# Patient Record
Sex: MALE | Race: BLACK OR AFRICAN AMERICAN | NOT HISPANIC OR LATINO | Employment: FULL TIME | URBAN - METROPOLITAN AREA
[De-identification: names, ages, dates, MRNs, and addresses within clinical notes are randomized per-mention and may not be internally consistent; named-entity substitution may affect disease eponyms.]

---

## 2019-11-28 ENCOUNTER — HOSPITAL ENCOUNTER (EMERGENCY)
Facility: OTHER | Age: 34
Discharge: HOME | End: 2019-11-28
Attending: EMERGENCY MEDICINE
Payer: MEDICAID

## 2019-11-28 VITALS
DIASTOLIC BLOOD PRESSURE: 92 MMHG | TEMPERATURE: 99 F | RESPIRATION RATE: 18 BRPM | HEART RATE: 78 BPM | BODY MASS INDEX: 32.08 KG/M2 | WEIGHT: 250 LBS | OXYGEN SATURATION: 99 % | HEIGHT: 74 IN | SYSTOLIC BLOOD PRESSURE: 136 MMHG

## 2019-11-28 DIAGNOSIS — R10.2 PERINEAL PAIN IN MALE: Primary | ICD-10-CM

## 2019-11-28 DIAGNOSIS — N41.0 ACUTE PROSTATITIS: ICD-10-CM

## 2019-11-28 LAB
BACTERIA #/AREA URNS HPF: ABNORMAL /HPF
BILIRUB UR QL STRIP: NEGATIVE
CLARITY UR: ABNORMAL
COLOR UR: YELLOW
GLUCOSE UR QL STRIP: NEGATIVE
HGB UR QL STRIP: NEGATIVE
KETONES UR QL STRIP: NEGATIVE
LEUKOCYTE ESTERASE UR QL STRIP: ABNORMAL
MICROSCOPIC COMMENT: ABNORMAL
NITRITE UR QL STRIP: NEGATIVE
PH UR STRIP: 6 [PH] (ref 5–8)
PROT UR QL STRIP: NEGATIVE
SP GR UR STRIP: >=1.03 (ref 1–1.03)
URN SPEC COLLECT METH UR: ABNORMAL
UROBILINOGEN UR STRIP-ACNC: 1 EU/DL
WBC #/AREA URNS HPF: >100 /HPF (ref 0–5)
WBC CLUMPS URNS QL MICRO: ABNORMAL

## 2019-11-28 PROCEDURE — 99284 EMERGENCY DEPT VISIT MOD MDM: CPT | Mod: 25

## 2019-11-28 PROCEDURE — 87086 URINE CULTURE/COLONY COUNT: CPT

## 2019-11-28 PROCEDURE — 96372 THER/PROPH/DIAG INJ SC/IM: CPT

## 2019-11-28 PROCEDURE — 25000003 PHARM REV CODE 250: Performed by: EMERGENCY MEDICINE

## 2019-11-28 PROCEDURE — 81003 URINALYSIS AUTO W/O SCOPE: CPT

## 2019-11-28 PROCEDURE — 87491 CHLMYD TRACH DNA AMP PROBE: CPT

## 2019-11-28 PROCEDURE — 63600175 PHARM REV CODE 636 W HCPCS: Performed by: EMERGENCY MEDICINE

## 2019-11-28 PROCEDURE — 81000 URINALYSIS NONAUTO W/SCOPE: CPT

## 2019-11-28 RX ORDER — ORPHENADRINE CITRATE 30 MG/ML
60 INJECTION INTRAMUSCULAR; INTRAVENOUS
Status: COMPLETED | OUTPATIENT
Start: 2019-11-28 | End: 2019-11-28

## 2019-11-28 RX ORDER — KETOROLAC TROMETHAMINE 30 MG/ML
15 INJECTION, SOLUTION INTRAMUSCULAR; INTRAVENOUS
Status: COMPLETED | OUTPATIENT
Start: 2019-11-28 | End: 2019-11-28

## 2019-11-28 RX ORDER — METHOCARBAMOL 750 MG/1
1500 TABLET, FILM COATED ORAL 3 TIMES DAILY PRN
Qty: 30 TABLET | Refills: 0 | Status: SHIPPED | OUTPATIENT
Start: 2019-11-28 | End: 2019-12-03

## 2019-11-28 RX ORDER — DOXYCYCLINE 100 MG/1
100 CAPSULE ORAL 2 TIMES DAILY
Qty: 20 CAPSULE | Refills: 0 | Status: SHIPPED | OUTPATIENT
Start: 2019-11-28 | End: 2019-12-08

## 2019-11-28 RX ORDER — DOXYCYCLINE HYCLATE 100 MG
100 TABLET ORAL
Status: COMPLETED | OUTPATIENT
Start: 2019-11-28 | End: 2019-11-28

## 2019-11-28 RX ORDER — CEFTRIAXONE 250 MG/1
250 INJECTION, POWDER, FOR SOLUTION INTRAMUSCULAR; INTRAVENOUS
Status: COMPLETED | OUTPATIENT
Start: 2019-11-28 | End: 2019-11-28

## 2019-11-28 RX ORDER — NAPROXEN 500 MG/1
500 TABLET ORAL 2 TIMES DAILY PRN
Qty: 60 TABLET | Refills: 0 | Status: ON HOLD | OUTPATIENT
Start: 2019-11-28 | End: 2023-10-08 | Stop reason: ALTCHOICE

## 2019-11-28 RX ADMIN — CEFTRIAXONE SODIUM 250 MG: 250 INJECTION, POWDER, FOR SOLUTION INTRAMUSCULAR; INTRAVENOUS at 02:11

## 2019-11-28 RX ADMIN — ORPHENADRINE CITRATE 60 MG: 30 INJECTION INTRAMUSCULAR; INTRAVENOUS at 01:11

## 2019-11-28 RX ADMIN — DOXYCYCLINE HYCLATE 100 MG: 100 TABLET, COATED ORAL at 02:11

## 2019-11-28 RX ADMIN — KETOROLAC TROMETHAMINE 15 MG: 30 INJECTION, SOLUTION INTRAMUSCULAR; INTRAVENOUS at 01:11

## 2019-11-28 NOTE — ED TRIAGE NOTES
"Pt arrives to ED with c/o "sore taint" with onset Saturday. Pt reports cough with onset Saturday, denies cough at this time, reports frequent coughing causing soreness to testicle area. Pt denies N/V/D, chest pain, SOB. Pt sitting in exam bed, respirations even, unlabored, eyes open spontaneously, NAD noted, AAOx4, answering questions appropriately.  "

## 2019-11-28 NOTE — DISCHARGE INSTRUCTIONS
Call your primary care doctor to make the first available appointment.     Keep all your medical appointments.     Take your regular medication as prescribed. Contact your primary care provider before running out of medication, or for any problems obtaining them.    Do not drive or operate heavy machinery while taking opioid or muscle relaxing medications. Examples include norco, percocet, xanax, valium, flexeril.     Overuse or long term use of pain and sedating medication may lead to addiction, dependence, organ failure, family and work problems, legal problems, accidental overdose and death.    If you do not have health insurance, you probably qualify for heavily discounted rates:  Call 1-424.303.9525 ([facility] hotline) or go to [email or web address].[place].gov    Your evaluation in the ED does not suggest any emergent or life threatening medical condition requiring admission or immediate intervention beyond that provided in the ED.   However, the signs of a serious problem sometimes take more time to appear.   RETURN TO THE ER if any of the following occur:    Weakness, dizziness, fainting, or loss of consciousness   Fever of 100.4ºF (38ºC) or higher  Any worse symptoms  Any new or concerning symptoms

## 2019-11-28 NOTE — ED PROVIDER NOTES
"Encounter Date: 11/28/2019    SCRIBE #1 NOTE: I, Vicente Padilla, am scribing for, and in the presence of, Dr. Mahmood .       History     Chief Complaint   Patient presents with    Muscle Pain     "I think I have a pulled muscle behind my testicles" Denies any heavy lifting, or injury.     Time seen by provider: 1:52 AM    This is a 34 y.o. male who presents with complaint of muscle pain that began four days ago. The patient reports that on 11/23/19 he started to experience flu-like symptoms specifically fevers, myalgias, and malaise.  These resolved after 2 days, however  He continued to have "muscle pain" to his "taint area".  Progression of this pain is gradually worsening, constant.  It is worse with sitting, certain positions and certain movements.  He denies fever, abdominal pain, blood in stool, dysuria, penile discharge, hematuria, or urinary frequency. He tried taking Aleve with minimal relief.    The history is provided by the patient and medical records.     Review of patient's allergies indicates:  No Known Allergies  History reviewed. No pertinent past medical history.  History reviewed. No pertinent surgical history.  History reviewed. No pertinent family history.  Social History     Tobacco Use    Smoking status: Current Every Day Smoker   Substance Use Topics    Alcohol use: No    Drug use: Yes     Types: Marijuana     ROS: As per HPI and below:   General: No fever.   HENT: No facial pain.   Eyes: Negative for eye pain.   Cardiovascular: No chest pain.   Respiratory: No dyspnea.   GI: No abdominal pain. No nausea. No vomiting. No diarrhea.   : Notes perineum pain. No dysuria. No hematuria. No dysuria.   Skin: No rashes.   Neuro:  No syncope. No focal deficits.   Musculoskeletal: No extremity pain.  All other systems reviewed and are negative.      Physical Exam     Initial Vitals [11/28/19 0052]   BP Pulse Resp Temp SpO2   (!) 137/91 80 16 98.8 °F (37.1 °C) 99 %      MAP       --         BP (!) " "136/92   Pulse 78   Temp 98.8 °F (37.1 °C) (Oral)   Resp 18   Ht 6' 2" (1.88 m)   Wt 113.4 kg (250 lb)   SpO2 99%   BMI 32.10 kg/m²     Nursing note and vitals reviewed.  Constitutional: AAOx3. Well appearing.   Eyes: EOMI. No discharge. Anicteric.  HENT:   Mouth/Throat: Oropharynx is clear and moist. Uvula midline.   Neck: Normal range of motion. Neck supple.    Cardiovascular: Normal rate  Pulmonary/Chest: No respiratory distress.   Abdominal: No distension. No tenderness.   Genitourinary: Penis normal. Cremasteric reflex is present. Right testis shows no mass, no swelling and no tenderness. Left testis shows no mass, no swelling and no tenderness. No phimosis, paraphimosis or penile erythema. No discharge found. Tenderness at the perineum. No peroneal or perirectal lesions.   Exam chaperoned by tech or RN.   Musculoskeletal: Normal range of motion. No CVA tenderness.    Neurological: GCS15. Alert and oriented to person, place, and time. No gross cranial nerve II-XII, focal strength, or light touch deficit. Steady gait.   Skin: Skin is warm and dry.   Psychiatric: Behavior is normal. Judgment normal.      ED Course   Procedures  Labs Reviewed   URINALYSIS, REFLEX TO URINE CULTURE - Abnormal; Notable for the following components:       Result Value    Appearance, UA Hazy (*)     Specific Gravity, UA >=1.030 (*)     Leukocytes, UA 1+ (*)     All other components within normal limits    Narrative:     Preferred Collection Type->Urine, Clean Catch   URINALYSIS MICROSCOPIC - Abnormal; Notable for the following components:    WBC, UA >100 (*)     WBC Clumps, UA Occasional (*)     All other components within normal limits    Narrative:     Preferred Collection Type->Urine, Clean Catch   C. TRACHOMATIS/N. GONORRHOEAE BY AMP DNA   CULTURE, URINE   C. TRACHOMATIS/N. GONORRHOEAE BY AMP DNA          Imaging Results    None          Medical Decision Making:   History:   Old Medical Records: I decided to obtain old " medical records.  Clinical Tests:   Lab Tests: Ordered and Reviewed            Scribe Attestation:   Scribe #1: I performed the above scribed service and the documentation accurately describes the services I performed. I attest to the accuracy of the note.    Attending Attestation:           Physician Attestation for Scribe:  Physician Attestation Statement for Scribe #1: I, Dr. Mahmood, reviewed documentation, as scribed by Vicente Padilla  in my presence, and it is both accurate and complete.                 ED Course as of Nov 28 0638   Thu Nov 28, 2019   0256 Patient is a 34-year-old male with no reported past medical history who presents with perineal pain over the last few days in the setting of brief illness marked by myalgias, fevers and malaise.  He denies any dysuria, hematuria, urinary urgency or hesitation.  He denies any fevers in the last few days.  No change in bowel habit.  He denies any other pain.  On exam, patient has perineal tenderness, otherwise no focal findings on physical exam.  History and physical not consistent with Fourniers gangrene and other deep space infection, abscess, trauma. Differential also included UTI, prostatitis, musculoskeletal pain.  I independently reviewed and interpreted labs which are notable for pyuria without hematuria.   We will treat patient for presumptive acute prostatitis, with NSAIDs and antispasmodics for comfort.  Will have the patient follow up with Urology.  I discussed with patient and/or guardian/caretaker that this evaluation in the ED does not suggest any emergent or life threatening medical condition requiring admission or immediate intervention beyond what was provided in the ED. Regardless, an unremarkable evaluation in the ED does not preclude the development or presence of a serious or life threatening condition. As such, patient was instructed to return immediately for any worsening or change in current symptoms.     I had a detailed discussion with  patient  and/or guardian/caretaker regarding findings, plan, return precautions, importance of medication adherence, need to follow-up with a PCP and specialist. All questions answered.     Note was created using voice recognition software. It may have occasional typographical errors not identified and edited despite initial review prior to signing.          [RC]      ED Course User Index  [RC] Rafael Mahmood MD                Clinical Impression:     1. Perineal pain in male    2. Acute prostatitis                              Rafael Mahmood MD  11/28/19 0638

## 2019-11-29 LAB
BACTERIA UR CULT: NO GROWTH
C TRACH DNA SPEC QL NAA+PROBE: NOT DETECTED
N GONORRHOEA DNA SPEC QL NAA+PROBE: NOT DETECTED

## 2019-12-16 ENCOUNTER — OFFICE VISIT (OUTPATIENT)
Dept: UROLOGY | Facility: CLINIC | Age: 34
End: 2019-12-16
Payer: MEDICAID

## 2019-12-16 VITALS
HEIGHT: 74 IN | HEART RATE: 83 BPM | DIASTOLIC BLOOD PRESSURE: 78 MMHG | WEIGHT: 250 LBS | BODY MASS INDEX: 32.08 KG/M2 | SYSTOLIC BLOOD PRESSURE: 125 MMHG

## 2019-12-16 DIAGNOSIS — N41.0 ACUTE PROSTATITIS: Primary | ICD-10-CM

## 2019-12-16 PROCEDURE — 99203 OFFICE O/P NEW LOW 30 MIN: CPT | Mod: S$GLB,,, | Performed by: NURSE PRACTITIONER

## 2019-12-16 PROCEDURE — 99203 PR OFFICE/OUTPT VISIT, NEW, LEVL III, 30-44 MIN: ICD-10-PCS | Mod: S$GLB,,, | Performed by: NURSE PRACTITIONER

## 2019-12-16 RX ORDER — TAMSULOSIN HYDROCHLORIDE 0.4 MG/1
0.4 CAPSULE ORAL DAILY
Qty: 30 CAPSULE | Refills: 0 | Status: ON HOLD | OUTPATIENT
Start: 2019-12-16 | End: 2023-10-08

## 2019-12-16 RX ORDER — SULFAMETHOXAZOLE AND TRIMETHOPRIM 800; 160 MG/1; MG/1
1 TABLET ORAL 2 TIMES DAILY
Qty: 42 TABLET | Refills: 0 | Status: SHIPPED | OUTPATIENT
Start: 2019-12-16 | End: 2020-01-06

## 2019-12-16 NOTE — PROGRESS NOTES
"Subjective:      Sinan Bear is a 34 y.o. male who was self-referred for evaluation of his perineal pain.    The patient presented to the ED on 11/28 with "flu-like symptoms specifically fevers, myalgias, malaise and perineal pain."  Urine culture showed no growth. Chlamydia/gonorrhea tests were negative. He was given rocephin IV and discharged with doxycycline x 10 days.    Today he reports improvement in his symptoms. Continues to have perineal discomfort that is aggravated by sitting. Denies slow stream, ROM, and frequency and urgency. Denies recent fever/chills. Denies penile discharge.     The following portions of the patient's history were reviewed and updated as appropriate: allergies, current medications, past family history, past medical history, past social history, past surgical history and problem list.    Review of Systems  Constitutional: no fever or chills  ENT: no nasal congestion or sore throat  Respiratory: no cough or shortness of breath  Cardiovascular: no chest pain or palpitations  Gastrointestinal: no nausea or vomiting, tolerating diet  Genitourinary: as per HPI  Hematologic/Lymphatic: no easy bruising or lymphadenopathy  Musculoskeletal: no arthralgias or myalgias  Neurological: no seizures or tremors  Behavioral/Psych: no auditory or visual hallucinations     Objective:   Vitals:   Vitals:    12/16/19 1250   BP: 125/78   Pulse: 83       Physical Exam   General: alert and oriented, no acute distress  Head: normocephalic, atraumatic  Neck: supple, no lymphadenopathy, normal ROM, no masses  Respiratory: Symmetric expansion, non-labored breathing  Cardiovascular: regular rate and rhythm, nomal pulses, no peripheral edema  Abdomen: soft, non tender, non distended, no palpable masses, no hernias, no hepatomegaly or splenomegaly  Genitourinary: deferred, acute infection   Lymphatic: no inguinal nodes  Skin: normal coloration and turgor, no rashes, no suspicious skin lesions noted  Neuro: " alert and oriented x3, no gross deficits  Psych: normal judgment and insight, normal mood/affect and non-anxious    Lab Review   Urinalysis demonstrates : no specimen, neg culture on 11/28   No results found for: WBC, HGB, HCT, MCV, PLT  No results found for: CREATININE, BUN  No results found for: PSA  Imaging   None    Assessment:   Acute prostatitis    Plan:   Sinan MALONE was seen today for prostatitis.    Diagnoses and all orders for this visit:    Acute prostatitis  -     sulfamethoxazole-trimethoprim 800-160mg (BACTRIM DS) 800-160 mg Tab; Take 1 tablet by mouth 2 (two) times daily. for 21 days  -     tamsulosin (FLOMAX) 0.4 mg Cap; Take 1 capsule (0.4 mg total) by mouth once daily.    Plan:  --Bactrim BID x 3 weeks  --Flomax if needed  --Ibuprofen PRN   --Follow up PRN

## 2021-03-01 ENCOUNTER — HOSPITAL ENCOUNTER (EMERGENCY)
Facility: OTHER | Age: 36
Discharge: HOME | End: 2021-03-01
Attending: EMERGENCY MEDICINE
Payer: MEDICAID

## 2021-03-01 VITALS
HEIGHT: 74 IN | RESPIRATION RATE: 18 BRPM | BODY MASS INDEX: 32.08 KG/M2 | HEART RATE: 103 BPM | WEIGHT: 250 LBS | SYSTOLIC BLOOD PRESSURE: 128 MMHG | DIASTOLIC BLOOD PRESSURE: 80 MMHG | TEMPERATURE: 99 F | OXYGEN SATURATION: 98 %

## 2021-03-01 DIAGNOSIS — K61.1 PERIRECTAL ABSCESS: Primary | ICD-10-CM

## 2021-03-01 LAB — POCT GLUCOSE: 106 MG/DL (ref 70–110)

## 2021-03-01 PROCEDURE — 46040 I&D ISCHIORCT&/PERIRCT ABSC: CPT

## 2021-03-01 PROCEDURE — 25000003 PHARM REV CODE 250: Performed by: EMERGENCY MEDICINE

## 2021-03-01 PROCEDURE — 99284 EMERGENCY DEPT VISIT MOD MDM: CPT | Mod: 25

## 2021-03-01 PROCEDURE — 82962 GLUCOSE BLOOD TEST: CPT

## 2021-03-01 RX ORDER — HYDROCODONE BITARTRATE AND ACETAMINOPHEN 10; 325 MG/1; MG/1
1 TABLET ORAL
Status: COMPLETED | OUTPATIENT
Start: 2021-03-01 | End: 2021-03-01

## 2021-03-01 RX ORDER — CLINDAMYCIN HYDROCHLORIDE 300 MG/1
300 CAPSULE ORAL EVERY 8 HOURS
Qty: 21 CAPSULE | Refills: 0 | Status: SHIPPED | OUTPATIENT
Start: 2021-03-01 | End: 2021-08-22 | Stop reason: SDUPTHER

## 2021-03-01 RX ORDER — LIDOCAINE HYDROCHLORIDE 10 MG/ML
5 INJECTION INFILTRATION; PERINEURAL
Status: COMPLETED | OUTPATIENT
Start: 2021-03-01 | End: 2021-03-01

## 2021-03-01 RX ORDER — HYDROCODONE BITARTRATE AND ACETAMINOPHEN 5; 325 MG/1; MG/1
1 TABLET ORAL EVERY 4 HOURS PRN
Qty: 12 TABLET | Refills: 0 | Status: ON HOLD | OUTPATIENT
Start: 2021-03-01 | End: 2023-10-08 | Stop reason: ALTCHOICE

## 2021-03-01 RX ADMIN — HYDROCODONE BITARTRATE AND ACETAMINOPHEN 1 TABLET: 10; 325 TABLET ORAL at 01:03

## 2021-03-01 RX ADMIN — LIDOCAINE HYDROCHLORIDE 5 ML: 10 INJECTION, SOLUTION INFILTRATION; PERINEURAL at 01:03

## 2021-05-03 ENCOUNTER — HOSPITAL ENCOUNTER (EMERGENCY)
Facility: OTHER | Age: 36
Discharge: HOME | End: 2021-05-04
Attending: EMERGENCY MEDICINE
Payer: MEDICAID

## 2021-05-03 DIAGNOSIS — R10.9 RIGHT FLANK PAIN: Primary | ICD-10-CM

## 2021-05-03 LAB
ANION GAP SERPL CALC-SCNC: 10 MMOL/L (ref 8–16)
BASOPHILS # BLD AUTO: 0.03 K/UL (ref 0–0.2)
BASOPHILS NFR BLD: 0.5 % (ref 0–1.9)
BUN SERPL-MCNC: 10 MG/DL (ref 6–20)
CALCIUM SERPL-MCNC: 9.1 MG/DL (ref 8.7–10.5)
CHLORIDE SERPL-SCNC: 109 MMOL/L (ref 95–110)
CO2 SERPL-SCNC: 20 MMOL/L (ref 23–29)
CREAT SERPL-MCNC: 1 MG/DL (ref 0.5–1.4)
DIFFERENTIAL METHOD: ABNORMAL
EOSINOPHIL # BLD AUTO: 0.5 K/UL (ref 0–0.5)
EOSINOPHIL NFR BLD: 8.6 % (ref 0–8)
ERYTHROCYTE [DISTWIDTH] IN BLOOD BY AUTOMATED COUNT: 13.2 % (ref 11.5–14.5)
EST. GFR  (AFRICAN AMERICAN): >60 ML/MIN/1.73 M^2
EST. GFR  (NON AFRICAN AMERICAN): >60 ML/MIN/1.73 M^2
GLUCOSE SERPL-MCNC: 95 MG/DL (ref 70–110)
HCT VFR BLD AUTO: 46.4 % (ref 40–54)
HGB BLD-MCNC: 15.4 G/DL (ref 14–18)
IMM GRANULOCYTES # BLD AUTO: 0.01 K/UL (ref 0–0.04)
IMM GRANULOCYTES NFR BLD AUTO: 0.2 % (ref 0–0.5)
LYMPHOCYTES # BLD AUTO: 2.8 K/UL (ref 1–4.8)
LYMPHOCYTES NFR BLD: 50.8 % (ref 18–48)
MCH RBC QN AUTO: 30.9 PG (ref 27–31)
MCHC RBC AUTO-ENTMCNC: 33.2 G/DL (ref 32–36)
MCV RBC AUTO: 93 FL (ref 82–98)
MONOCYTES # BLD AUTO: 0.4 K/UL (ref 0.3–1)
MONOCYTES NFR BLD: 7 % (ref 4–15)
NEUTROPHILS # BLD AUTO: 1.8 K/UL (ref 1.8–7.7)
NEUTROPHILS NFR BLD: 32.9 % (ref 38–73)
NRBC BLD-RTO: 0 /100 WBC
PLATELET # BLD AUTO: 272 K/UL (ref 150–450)
PMV BLD AUTO: 8.6 FL (ref 9.2–12.9)
POTASSIUM SERPL-SCNC: 4.3 MMOL/L (ref 3.5–5.1)
RBC # BLD AUTO: 4.98 M/UL (ref 4.6–6.2)
SODIUM SERPL-SCNC: 139 MMOL/L (ref 136–145)
WBC # BLD AUTO: 5.55 K/UL (ref 3.9–12.7)

## 2021-05-03 PROCEDURE — 80048 BASIC METABOLIC PNL TOTAL CA: CPT | Performed by: EMERGENCY MEDICINE

## 2021-05-03 PROCEDURE — 85025 COMPLETE CBC W/AUTO DIFF WBC: CPT | Performed by: EMERGENCY MEDICINE

## 2021-05-03 PROCEDURE — 99283 EMERGENCY DEPT VISIT LOW MDM: CPT

## 2021-05-04 VITALS
DIASTOLIC BLOOD PRESSURE: 78 MMHG | TEMPERATURE: 99 F | SYSTOLIC BLOOD PRESSURE: 140 MMHG | BODY MASS INDEX: 32.1 KG/M2 | WEIGHT: 250 LBS | OXYGEN SATURATION: 99 % | HEART RATE: 78 BPM | RESPIRATION RATE: 18 BRPM

## 2021-05-04 RX ORDER — NAPROXEN 500 MG/1
500 TABLET ORAL 2 TIMES DAILY WITH MEALS
Qty: 20 TABLET | Refills: 0 | Status: ON HOLD | OUTPATIENT
Start: 2021-05-04 | End: 2023-10-08 | Stop reason: ALTCHOICE

## 2021-05-05 ENCOUNTER — HOSPITAL ENCOUNTER (EMERGENCY)
Facility: OTHER | Age: 36
Discharge: HOME | End: 2021-05-05
Attending: EMERGENCY MEDICINE
Payer: MEDICAID

## 2021-05-05 VITALS
TEMPERATURE: 99 F | HEIGHT: 74 IN | RESPIRATION RATE: 18 BRPM | BODY MASS INDEX: 32.08 KG/M2 | SYSTOLIC BLOOD PRESSURE: 143 MMHG | DIASTOLIC BLOOD PRESSURE: 74 MMHG | WEIGHT: 250 LBS | HEART RATE: 78 BPM | OXYGEN SATURATION: 98 %

## 2021-05-05 DIAGNOSIS — M54.50 ACUTE RIGHT-SIDED LOW BACK PAIN WITHOUT SCIATICA: Primary | ICD-10-CM

## 2021-05-05 DIAGNOSIS — T14.8XXA MUSCULOSKELETAL STRAIN: ICD-10-CM

## 2021-05-05 LAB
BACTERIA #/AREA URNS HPF: ABNORMAL /HPF
BILIRUB UR QL STRIP: NEGATIVE
CLARITY UR: ABNORMAL
COLOR UR: YELLOW
GLUCOSE UR QL STRIP: NEGATIVE
HGB UR QL STRIP: NEGATIVE
KETONES UR QL STRIP: ABNORMAL
LEUKOCYTE ESTERASE UR QL STRIP: ABNORMAL
MICROSCOPIC COMMENT: ABNORMAL
NITRITE UR QL STRIP: NEGATIVE
PH UR STRIP: 6 [PH] (ref 5–8)
PROT UR QL STRIP: NEGATIVE
SP GR UR STRIP: >=1.03 (ref 1–1.03)
SQUAMOUS #/AREA URNS HPF: 3 /HPF
URN SPEC COLLECT METH UR: ABNORMAL
UROBILINOGEN UR STRIP-ACNC: NEGATIVE EU/DL
WBC #/AREA URNS HPF: 7 /HPF (ref 0–5)

## 2021-05-05 PROCEDURE — 25000003 PHARM REV CODE 250: Performed by: NURSE PRACTITIONER

## 2021-05-05 PROCEDURE — 99284 EMERGENCY DEPT VISIT MOD MDM: CPT

## 2021-05-05 PROCEDURE — 81000 URINALYSIS NONAUTO W/SCOPE: CPT | Performed by: EMERGENCY MEDICINE

## 2021-05-05 RX ORDER — LIDOCAINE 50 MG/G
1 PATCH TOPICAL
Status: DISCONTINUED | OUTPATIENT
Start: 2021-05-05 | End: 2021-05-05 | Stop reason: HOSPADM

## 2021-05-05 RX ORDER — LIDOCAINE 50 MG/G
1 PATCH TOPICAL DAILY
Qty: 15 PATCH | Refills: 0 | Status: ON HOLD | OUTPATIENT
Start: 2021-05-05 | End: 2023-10-08 | Stop reason: ALTCHOICE

## 2021-05-05 RX ORDER — NAPROXEN 500 MG/1
500 TABLET ORAL
Status: COMPLETED | OUTPATIENT
Start: 2021-05-05 | End: 2021-05-05

## 2021-05-05 RX ORDER — METHOCARBAMOL 750 MG/1
1500 TABLET, FILM COATED ORAL 3 TIMES DAILY
Qty: 30 TABLET | Refills: 0 | Status: SHIPPED | OUTPATIENT
Start: 2021-05-05 | End: 2021-05-10

## 2021-05-05 RX ORDER — METHOCARBAMOL 750 MG/1
1500 TABLET, FILM COATED ORAL 3 TIMES DAILY
Qty: 30 TABLET | Refills: 0 | Status: SHIPPED | OUTPATIENT
Start: 2021-05-05 | End: 2021-05-05 | Stop reason: SDUPTHER

## 2021-05-05 RX ORDER — LIDOCAINE 50 MG/G
1 PATCH TOPICAL DAILY
Qty: 15 PATCH | Refills: 0 | Status: SHIPPED | OUTPATIENT
Start: 2021-05-05 | End: 2021-05-05 | Stop reason: SDUPTHER

## 2021-05-05 RX ORDER — METHOCARBAMOL 750 MG/1
1500 TABLET, FILM COATED ORAL
Status: COMPLETED | OUTPATIENT
Start: 2021-05-05 | End: 2021-05-05

## 2021-05-05 RX ADMIN — LIDOCAINE 1 PATCH: 50 PATCH TOPICAL at 06:05

## 2021-05-05 RX ADMIN — METHOCARBAMOL TABLETS 1500 MG: 750 TABLET, COATED ORAL at 06:05

## 2021-05-05 RX ADMIN — NAPROXEN 500 MG: 500 TABLET ORAL at 06:05

## 2021-08-21 PROCEDURE — 10061 I&D ABSCESS COMP/MULTIPLE: CPT

## 2021-08-21 PROCEDURE — 99283 EMERGENCY DEPT VISIT LOW MDM: CPT | Mod: 25

## 2021-08-22 ENCOUNTER — HOSPITAL ENCOUNTER (EMERGENCY)
Facility: OTHER | Age: 36
Discharge: HOME | End: 2021-08-22
Attending: EMERGENCY MEDICINE
Payer: MEDICAID

## 2021-08-22 VITALS
BODY MASS INDEX: 32.08 KG/M2 | DIASTOLIC BLOOD PRESSURE: 88 MMHG | HEIGHT: 74 IN | TEMPERATURE: 99 F | RESPIRATION RATE: 18 BRPM | SYSTOLIC BLOOD PRESSURE: 163 MMHG | HEART RATE: 89 BPM | WEIGHT: 250 LBS | OXYGEN SATURATION: 100 %

## 2021-08-22 DIAGNOSIS — L02.31 GLUTEAL ABSCESS: Primary | ICD-10-CM

## 2021-08-22 DIAGNOSIS — R03.0 ELEVATED BLOOD PRESSURE READING: ICD-10-CM

## 2021-08-22 PROCEDURE — 10061 I&D ABSCESS COMP/MULTIPLE: CPT

## 2021-08-22 PROCEDURE — 25000003 PHARM REV CODE 250: Performed by: EMERGENCY MEDICINE

## 2021-08-22 RX ORDER — LIDOCAINE HYDROCHLORIDE 10 MG/ML
10 INJECTION INFILTRATION; PERINEURAL
Status: COMPLETED | OUTPATIENT
Start: 2021-08-22 | End: 2021-08-22

## 2021-08-22 RX ORDER — CLINDAMYCIN HYDROCHLORIDE 300 MG/1
300 CAPSULE ORAL EVERY 8 HOURS
Qty: 21 CAPSULE | Refills: 0 | Status: ON HOLD | OUTPATIENT
Start: 2021-08-22 | End: 2023-10-08 | Stop reason: ALTCHOICE

## 2021-08-22 RX ORDER — CLINDAMYCIN HYDROCHLORIDE 150 MG/1
300 CAPSULE ORAL
Status: COMPLETED | OUTPATIENT
Start: 2021-08-22 | End: 2021-08-22

## 2021-08-22 RX ADMIN — LIDOCAINE HYDROCHLORIDE 10 ML: 10 INJECTION, SOLUTION INFILTRATION; PERINEURAL at 12:08

## 2021-08-22 RX ADMIN — CLINDAMYCIN HYDROCHLORIDE 300 MG: 150 CAPSULE ORAL at 12:08

## 2023-10-07 ENCOUNTER — HOSPITAL ENCOUNTER (INPATIENT)
Facility: OTHER | Age: 38
LOS: 3 days | Discharge: HOME | DRG: 603 | End: 2023-10-11
Attending: EMERGENCY MEDICINE | Admitting: HOSPITALIST
Payer: COMMERCIAL

## 2023-10-07 DIAGNOSIS — N41.9 PROSTATITIS, UNSPECIFIED PROSTATITIS TYPE: Primary | ICD-10-CM

## 2023-10-07 DIAGNOSIS — Z76.89 ENCOUNTER TO ESTABLISH CARE: ICD-10-CM

## 2023-10-07 DIAGNOSIS — F17.210 NICOTINE DEPENDENCE, CIGARETTES, UNCOMPLICATED: ICD-10-CM

## 2023-10-07 DIAGNOSIS — Z72.0 TOBACCO ABUSE: ICD-10-CM

## 2023-10-07 DIAGNOSIS — R03.0 ELEVATED BP WITHOUT DIAGNOSIS OF HYPERTENSION: ICD-10-CM

## 2023-10-07 DIAGNOSIS — N48.21 PENILE ABSCESS: ICD-10-CM

## 2023-10-07 DIAGNOSIS — L02.215 PERINEAL ABSCESS: ICD-10-CM

## 2023-10-07 PROCEDURE — 80053 COMPREHEN METABOLIC PANEL: CPT | Performed by: EMERGENCY MEDICINE

## 2023-10-07 PROCEDURE — 87389 HIV-1 AG W/HIV-1&-2 AB AG IA: CPT | Performed by: INTERNAL MEDICINE

## 2023-10-07 PROCEDURE — 99285 EMERGENCY DEPT VISIT HI MDM: CPT

## 2023-10-07 PROCEDURE — 86803 HEPATITIS C AB TEST: CPT | Performed by: INTERNAL MEDICINE

## 2023-10-07 PROCEDURE — 85025 COMPLETE CBC W/AUTO DIFF WBC: CPT | Performed by: EMERGENCY MEDICINE

## 2023-10-07 NOTE — Clinical Note
Diagnosis: Prostatitis, unspecified prostatitis type [[id number]]   Admitting Provider:: VERNA AVILES [[id number]]   Future Attending Provider: VERNA AVILES [[id number]]   Reason for IP Medical Treatment  (Clinical interventions that can only be accomplished in the IP setting? ) :: abscess   I certify that Inpatient services for greater than or equal to 2 midnights are medically necessary:: Yes   Plans for Post-Acute care--if anticipated (pick the single best option):: C. Discharge home with home health services

## 2023-10-08 PROBLEM — F17.210 NICOTINE DEPENDENCE, CIGARETTES, UNCOMPLICATED: Status: ACTIVE | Noted: 2023-10-08

## 2023-10-08 PROBLEM — L02.91 ABSCESS: Status: ACTIVE | Noted: 2023-10-08

## 2023-10-08 PROBLEM — L02.215 PERINEAL ABSCESS: Status: ACTIVE | Noted: 2023-10-08

## 2023-10-08 LAB
ALBUMIN SERPL BCP-MCNC: 4 G/DL (ref 3.5–5.2)
ALBUMIN SERPL BCP-MCNC: 4.1 G/DL (ref 3.5–5.2)
ALP SERPL-CCNC: 71 U/L (ref 55–135)
ALP SERPL-CCNC: 73 U/L (ref 55–135)
ALT SERPL W/O P-5'-P-CCNC: 20 U/L (ref 10–44)
ALT SERPL W/O P-5'-P-CCNC: 23 U/L (ref 10–44)
ANION GAP SERPL CALC-SCNC: 10 MMOL/L (ref 8–16)
ANION GAP SERPL CALC-SCNC: 11 MMOL/L (ref 8–16)
AST SERPL-CCNC: 17 U/L (ref 10–40)
AST SERPL-CCNC: 19 U/L (ref 10–40)
BASOPHILS # BLD AUTO: 0.03 K/UL (ref 0–0.2)
BASOPHILS # BLD AUTO: 0.04 K/UL (ref 0–0.2)
BASOPHILS NFR BLD: 0.4 % (ref 0–1.9)
BASOPHILS NFR BLD: 0.4 % (ref 0–1.9)
BILIRUB SERPL-MCNC: 0.3 MG/DL (ref 0.1–1)
BILIRUB SERPL-MCNC: 0.6 MG/DL (ref 0.1–1)
BILIRUB UR QL STRIP: NEGATIVE
BUN SERPL-MCNC: 11 MG/DL (ref 6–20)
BUN SERPL-MCNC: 7 MG/DL (ref 6–20)
CALCIUM SERPL-MCNC: 9.3 MG/DL (ref 8.7–10.5)
CALCIUM SERPL-MCNC: 9.5 MG/DL (ref 8.7–10.5)
CHLORIDE SERPL-SCNC: 107 MMOL/L (ref 95–110)
CHLORIDE SERPL-SCNC: 107 MMOL/L (ref 95–110)
CLARITY UR: CLEAR
CO2 SERPL-SCNC: 22 MMOL/L (ref 23–29)
CO2 SERPL-SCNC: 23 MMOL/L (ref 23–29)
COLOR UR: YELLOW
CREAT SERPL-MCNC: 1 MG/DL (ref 0.5–1.4)
CREAT SERPL-MCNC: 1 MG/DL (ref 0.5–1.4)
DIFFERENTIAL METHOD: ABNORMAL
DIFFERENTIAL METHOD: ABNORMAL
EOSINOPHIL # BLD AUTO: 0.3 K/UL (ref 0–0.5)
EOSINOPHIL # BLD AUTO: 0.3 K/UL (ref 0–0.5)
EOSINOPHIL NFR BLD: 3.4 % (ref 0–8)
EOSINOPHIL NFR BLD: 4.1 % (ref 0–8)
ERYTHROCYTE [DISTWIDTH] IN BLOOD BY AUTOMATED COUNT: 12.7 % (ref 11.5–14.5)
ERYTHROCYTE [DISTWIDTH] IN BLOOD BY AUTOMATED COUNT: 12.8 % (ref 11.5–14.5)
EST. GFR  (NO RACE VARIABLE): >60 ML/MIN/1.73 M^2
EST. GFR  (NO RACE VARIABLE): >60 ML/MIN/1.73 M^2
GLUCOSE SERPL-MCNC: 103 MG/DL (ref 70–110)
GLUCOSE SERPL-MCNC: 94 MG/DL (ref 70–110)
GLUCOSE UR QL STRIP: NEGATIVE
HCT VFR BLD AUTO: 45.8 % (ref 40–54)
HCT VFR BLD AUTO: 48.1 % (ref 40–54)
HCV AB SERPL QL IA: NEGATIVE
HGB BLD-MCNC: 15.3 G/DL (ref 14–18)
HGB BLD-MCNC: 15.8 G/DL (ref 14–18)
HGB UR QL STRIP: NEGATIVE
HIV 1+2 AB+HIV1 P24 AG SERPL QL IA: NEGATIVE
IMM GRANULOCYTES # BLD AUTO: 0.02 K/UL (ref 0–0.04)
IMM GRANULOCYTES # BLD AUTO: 0.03 K/UL (ref 0–0.04)
IMM GRANULOCYTES NFR BLD AUTO: 0.2 % (ref 0–0.5)
IMM GRANULOCYTES NFR BLD AUTO: 0.4 % (ref 0–0.5)
KETONES UR QL STRIP: NEGATIVE
LEUKOCYTE ESTERASE UR QL STRIP: NEGATIVE
LYMPHOCYTES # BLD AUTO: 3.1 K/UL (ref 1–4.8)
LYMPHOCYTES # BLD AUTO: 4.2 K/UL (ref 1–4.8)
LYMPHOCYTES NFR BLD: 36.9 % (ref 18–48)
LYMPHOCYTES NFR BLD: 44.5 % (ref 18–48)
MAGNESIUM SERPL-MCNC: 2.1 MG/DL (ref 1.6–2.6)
MCH RBC QN AUTO: 31 PG (ref 27–31)
MCH RBC QN AUTO: 31.2 PG (ref 27–31)
MCHC RBC AUTO-ENTMCNC: 32.8 G/DL (ref 32–36)
MCHC RBC AUTO-ENTMCNC: 33.4 G/DL (ref 32–36)
MCV RBC AUTO: 93 FL (ref 82–98)
MCV RBC AUTO: 94 FL (ref 82–98)
MONOCYTES # BLD AUTO: 0.7 K/UL (ref 0.3–1)
MONOCYTES # BLD AUTO: 0.9 K/UL (ref 0.3–1)
MONOCYTES NFR BLD: 8.3 % (ref 4–15)
MONOCYTES NFR BLD: 9.9 % (ref 4–15)
NEUTROPHILS # BLD AUTO: 3.9 K/UL (ref 1.8–7.7)
NEUTROPHILS # BLD AUTO: 4.2 K/UL (ref 1.8–7.7)
NEUTROPHILS NFR BLD: 41.6 % (ref 38–73)
NEUTROPHILS NFR BLD: 49.9 % (ref 38–73)
NITRITE UR QL STRIP: NEGATIVE
NRBC BLD-RTO: 0 /100 WBC
NRBC BLD-RTO: 0 /100 WBC
PH UR STRIP: 6 [PH] (ref 5–8)
PHOSPHATE SERPL-MCNC: 3.1 MG/DL (ref 2.7–4.5)
PLATELET # BLD AUTO: 270 K/UL (ref 150–450)
PLATELET # BLD AUTO: 292 K/UL (ref 150–450)
PMV BLD AUTO: 8.6 FL (ref 9.2–12.9)
PMV BLD AUTO: 8.8 FL (ref 9.2–12.9)
POTASSIUM SERPL-SCNC: 4 MMOL/L (ref 3.5–5.1)
POTASSIUM SERPL-SCNC: 4 MMOL/L (ref 3.5–5.1)
PROT SERPL-MCNC: 8 G/DL (ref 6–8.4)
PROT SERPL-MCNC: 8.1 G/DL (ref 6–8.4)
PROT UR QL STRIP: NEGATIVE
RBC # BLD AUTO: 4.91 M/UL (ref 4.6–6.2)
RBC # BLD AUTO: 5.1 M/UL (ref 4.6–6.2)
SODIUM SERPL-SCNC: 140 MMOL/L (ref 136–145)
SODIUM SERPL-SCNC: 140 MMOL/L (ref 136–145)
SP GR UR STRIP: 1.01 (ref 1–1.03)
URN SPEC COLLECT METH UR: NORMAL
UROBILINOGEN UR STRIP-ACNC: NEGATIVE EU/DL
WBC # BLD AUTO: 8.33 K/UL (ref 3.9–12.7)
WBC # BLD AUTO: 9.37 K/UL (ref 3.9–12.7)

## 2023-10-08 PROCEDURE — 11000001 HC ACUTE MED/SURG PRIVATE ROOM

## 2023-10-08 PROCEDURE — 81003 URINALYSIS AUTO W/O SCOPE: CPT | Performed by: EMERGENCY MEDICINE

## 2023-10-08 PROCEDURE — 63600175 PHARM REV CODE 636 W HCPCS: Performed by: EMERGENCY MEDICINE

## 2023-10-08 PROCEDURE — 25000003 PHARM REV CODE 250: Performed by: NURSE PRACTITIONER

## 2023-10-08 PROCEDURE — 84100 ASSAY OF PHOSPHORUS: CPT | Performed by: NURSE PRACTITIONER

## 2023-10-08 PROCEDURE — 63600175 PHARM REV CODE 636 W HCPCS: Performed by: NURSE PRACTITIONER

## 2023-10-08 PROCEDURE — 25000003 PHARM REV CODE 250: Performed by: EMERGENCY MEDICINE

## 2023-10-08 PROCEDURE — 25500020 PHARM REV CODE 255: Performed by: EMERGENCY MEDICINE

## 2023-10-08 PROCEDURE — 63600175 PHARM REV CODE 636 W HCPCS: Performed by: HOSPITALIST

## 2023-10-08 PROCEDURE — 99223 PR INITIAL HOSPITAL CARE,LEVL III: ICD-10-PCS | Mod: ,,, | Performed by: NURSE PRACTITIONER

## 2023-10-08 PROCEDURE — 83735 ASSAY OF MAGNESIUM: CPT | Performed by: NURSE PRACTITIONER

## 2023-10-08 PROCEDURE — 99233 PR SUBSEQUENT HOSPITAL CARE,LEVL III: ICD-10-PCS | Mod: ,,, | Performed by: UROLOGY

## 2023-10-08 PROCEDURE — 99223 1ST HOSP IP/OBS HIGH 75: CPT | Mod: ,,, | Performed by: NURSE PRACTITIONER

## 2023-10-08 PROCEDURE — 80053 COMPREHEN METABOLIC PANEL: CPT | Performed by: NURSE PRACTITIONER

## 2023-10-08 PROCEDURE — 25000003 PHARM REV CODE 250: Performed by: HOSPITALIST

## 2023-10-08 PROCEDURE — 36415 COLL VENOUS BLD VENIPUNCTURE: CPT | Performed by: NURSE PRACTITIONER

## 2023-10-08 PROCEDURE — 85025 COMPLETE CBC W/AUTO DIFF WBC: CPT | Performed by: NURSE PRACTITIONER

## 2023-10-08 PROCEDURE — 99233 SBSQ HOSP IP/OBS HIGH 50: CPT | Mod: ,,, | Performed by: UROLOGY

## 2023-10-08 RX ORDER — NALOXONE HCL 0.4 MG/ML
0.02 VIAL (ML) INJECTION
Status: DISCONTINUED | OUTPATIENT
Start: 2023-10-08 | End: 2023-10-11 | Stop reason: HOSPADM

## 2023-10-08 RX ORDER — SODIUM CHLORIDE 9 MG/ML
INJECTION, SOLUTION INTRAVENOUS CONTINUOUS
Status: DISCONTINUED | OUTPATIENT
Start: 2023-10-08 | End: 2023-10-10

## 2023-10-08 RX ORDER — HYDROCODONE BITARTRATE AND ACETAMINOPHEN 5; 325 MG/1; MG/1
1 TABLET ORAL EVERY 4 HOURS PRN
Status: DISCONTINUED | OUTPATIENT
Start: 2023-10-08 | End: 2023-10-11 | Stop reason: HOSPADM

## 2023-10-08 RX ORDER — CIPROFLOXACIN 500 MG/1
500 TABLET ORAL
Status: DISCONTINUED | OUTPATIENT
Start: 2023-10-08 | End: 2023-10-08

## 2023-10-08 RX ORDER — ONDANSETRON 2 MG/ML
4 INJECTION INTRAMUSCULAR; INTRAVENOUS EVERY 8 HOURS PRN
Status: DISCONTINUED | OUTPATIENT
Start: 2023-10-08 | End: 2023-10-11 | Stop reason: HOSPADM

## 2023-10-08 RX ORDER — MORPHINE SULFATE 4 MG/ML
4 INJECTION, SOLUTION INTRAMUSCULAR; INTRAVENOUS
Status: DISCONTINUED | OUTPATIENT
Start: 2023-10-08 | End: 2023-10-08

## 2023-10-08 RX ORDER — SODIUM CHLORIDE 0.9 % (FLUSH) 0.9 %
10 SYRINGE (ML) INJECTION EVERY 8 HOURS PRN
Status: DISCONTINUED | OUTPATIENT
Start: 2023-10-08 | End: 2023-10-11 | Stop reason: HOSPADM

## 2023-10-08 RX ORDER — ACETAMINOPHEN 325 MG/1
650 TABLET ORAL EVERY 4 HOURS PRN
Status: DISCONTINUED | OUTPATIENT
Start: 2023-10-08 | End: 2023-10-11 | Stop reason: HOSPADM

## 2023-10-08 RX ORDER — MORPHINE SULFATE 4 MG/ML
4 INJECTION, SOLUTION INTRAMUSCULAR; INTRAVENOUS EVERY 4 HOURS PRN
Status: DISCONTINUED | OUTPATIENT
Start: 2023-10-08 | End: 2023-10-11 | Stop reason: HOSPADM

## 2023-10-08 RX ORDER — ONDANSETRON 2 MG/ML
4 INJECTION INTRAMUSCULAR; INTRAVENOUS ONCE AS NEEDED
Status: DISCONTINUED | OUTPATIENT
Start: 2023-10-08 | End: 2023-10-11 | Stop reason: HOSPADM

## 2023-10-08 RX ORDER — SODIUM CHLORIDE 9 MG/ML
1000 INJECTION, SOLUTION INTRAVENOUS
Status: DISCONTINUED | OUTPATIENT
Start: 2023-10-08 | End: 2023-10-08

## 2023-10-08 RX ORDER — IBUPROFEN 200 MG
1 TABLET ORAL DAILY
Status: DISCONTINUED | OUTPATIENT
Start: 2023-10-08 | End: 2023-10-11 | Stop reason: HOSPADM

## 2023-10-08 RX ADMIN — VANCOMYCIN HYDROCHLORIDE 2000 MG: 500 INJECTION, POWDER, LYOPHILIZED, FOR SOLUTION INTRAVENOUS at 05:10

## 2023-10-08 RX ADMIN — PIPERACILLIN AND TAZOBACTAM 4.5 G: 4; .5 INJECTION, POWDER, LYOPHILIZED, FOR SOLUTION INTRAVENOUS; PARENTERAL at 08:10

## 2023-10-08 RX ADMIN — IOHEXOL 100 ML: 350 INJECTION, SOLUTION INTRAVENOUS at 01:10

## 2023-10-08 RX ADMIN — PIPERACILLIN AND TAZOBACTAM 4.5 G: 4; .5 INJECTION, POWDER, LYOPHILIZED, FOR SOLUTION INTRAVENOUS; PARENTERAL at 12:10

## 2023-10-08 RX ADMIN — PIPERACILLIN AND TAZOBACTAM 4.5 G: 4; .5 INJECTION, POWDER, LYOPHILIZED, FOR SOLUTION INTRAVENOUS; PARENTERAL at 03:10

## 2023-10-08 RX ADMIN — MORPHINE SULFATE 4 MG: 4 INJECTION, SOLUTION INTRAMUSCULAR; INTRAVENOUS at 05:10

## 2023-10-08 RX ADMIN — SODIUM CHLORIDE: 9 INJECTION, SOLUTION INTRAVENOUS at 04:10

## 2023-10-08 RX ADMIN — MORPHINE SULFATE 4 MG: 4 INJECTION, SOLUTION INTRAMUSCULAR; INTRAVENOUS at 01:10

## 2023-10-08 RX ADMIN — MORPHINE SULFATE 4 MG: 4 INJECTION, SOLUTION INTRAMUSCULAR; INTRAVENOUS at 10:10

## 2023-10-08 NOTE — PLAN OF CARE
Case Management Assessment     PCP: Patient has no PcP  Pharmacy: Bedside     Patient Arrived From: Home   Existing Help at Home: en     Barriers to Discharge: none    Discharge Plan:    A. Home with family    B. Home health      10/08/23 1018   Discharge Assessment   Assessment Type Discharge Planning Assessment   Confirmed/corrected address, phone number and insurance Yes   Confirmed Demographics Correct on Facesheet   Source of Information patient;family;health record   People in Home significant other   Do you expect to return to your current living situation? Yes   Do you have help at home or someone to help you manage your care at home? Yes   Prior to hospitilization cognitive status: Alert/Oriented   Current cognitive status: Alert/Oriented   Equipment Currently Used at Home none   Readmission within 30 days? No   Do you currently have service(s) that help you manage your care at home? No   Do you take prescription medications? No   Do you have prescription coverage? Yes   Coverage [employer] BETTER   Do you have any problems affording any of your prescribed medications? No   How do you get to doctors appointments? car, drives self;family or friend will provide   Are you on dialysis? No   Do you take coumadin? No   DME Needed Upon Discharge  none   Discharge Plan discussed with: Patient;Spouse/sig other   Transition of Care Barriers None   Discharge Plan A Home with family   Discharge Plan B Home Health   Physical Activity   On average, how many days per week do you engage in moderate to strenuous exercise (like a brisk walk)? 4 days   On average, how many minutes do you engage in exercise at this level? 30 min   Financial Resource Strain   How hard is it for you to pay for the very basics like food, housing, medical care, and heating? Not hard   Housing Stability   In the last 12 months, was there a time when you were not able to pay the mortgage or rent on time? N   In the last 12 months, was there a time  when you did not have a steady place to sleep or slept in a shelter (including now)? N   Transportation Needs   In the past 12 months, has lack of transportation kept you from medical appointments or from getting medications? no   In the past 12 months, has lack of transportation kept you from meetings, work, or from getting things needed for daily living? No   Food Insecurity   Within the past 12 months, you worried that your food would run out before you got the money to buy more. Never true   Within the past 12 months, the food you bought just didn't last and you didn't have money to get more. Never true   Stress   Do you feel stress - tense, restless, nervous, or anxious, or unable to sleep at night because your mind is troubled all the time - these days? Not at all   Social Connections   In a typical week, how many times do you talk on the phone with family, friends, or neighbors? Three   How often do you get together with friends or relatives? Three times   How often do you attend Shinto or Rastafarian services? 1 to 4   Do you belong to any clubs or organizations such as Shinto groups, unions, fraternal or athletic groups, or school groups? No   How often do you attend meetings of the clubs or organizations you belong to? Never   Are you , , , , never , or living with a partner?

## 2023-10-08 NOTE — PROGRESS NOTES
"[address] Medicine  Progress Note    Patient Name: Sinan Bear  MRN: [id number]  Patient Class: IP- Inpatient   Admission Date: 10/7/2023  Length of Stay: 0 days  Attending Physician: Lucille Caicedo MD  Primary Care Provider: Raisa, Primary Doctor        Subjective:     Principal Problem:Abscess        HPI:  From H&P by Joon Cintron NP:  "The patient is a 38 year old male with no significant past medical history who presents with a painful area between his penis and rectum.  He states the pain started in the last 24 hours with acute worsening over the last 3-4 hours.  CT done showing abscess at the base of the penis.  He will be admitted for further management of his abscess and Urology consult."      Overview/Hospital Course:  Patient made NPO and admitted on empiric IV antibiotics (vancomycin and Zosyn) and Urology was consulted for evaluation.      Interval History: Patient is new to me.  He has no complaints - has some pain in the perineal area but does not want more pain medication.  No nausea.  He refused to have labs drawn this AM.  He confirms that he has no medical problems, takes no medications but does smoke cigarettes every day.  Nicotine patch was ordered but he declined it.    Review of Systems   Constitutional:  Negative for chills and fever.   Respiratory:  Negative for cough and shortness of breath.    Cardiovascular:  Negative for chest pain and palpitations.   Genitourinary:         Pain in perineal area     Objective:     Vital Signs (Most Recent):  Temp: 98.3 °F (36.8 °C) (10/08/23 0704)  Pulse: 74 (10/08/23 0704)  Resp: 16 (10/08/23 0704)  BP: 137/84 (10/08/23 0704)  SpO2: 99 % (10/08/23 0704) Vital Signs (24h Range):  Temp:  [98.3 °F (36.8 °C)-99 °F (37.2 °C)] 98.3 °F (36.8 °C)  Pulse:  [74-91] 74  Resp:  [16-18] 16  SpO2:  [99 %] 99 %  BP: (129-176)/(84-92) 137/84     Weight: 123.2 kg (271 lb 9.7 oz)  Body mass index is 34.87 kg/m².  No intake or " output data in the 24 hours ending 10/08/23 1016      Physical Exam  Constitutional:       Comments: Appears uncomfortable   Cardiovascular:      Rate and Rhythm: Normal rate and regular rhythm.      Pulses: Normal pulses.      Heart sounds: Normal heart sounds. No murmur heard.     No gallop.   Pulmonary:      Effort: Pulmonary effort is normal.      Breath sounds: Normal breath sounds.   Abdominal:      General: Bowel sounds are normal.      Palpations: Abdomen is soft.   Skin:     General: Skin is warm and dry.   Neurological:      General: No focal deficit present.      Mental Status: He is alert and oriented to person, place, and time.             Significant Labs: All pertinent labs within the past 24 hours have been reviewed.    Significant Imaging: I have reviewed all pertinent imaging results/findings within the past 24 hours.      Assessment/Plan:      * Abscess  - Patient presented with one day of pain between the penis and rectum.  Patient with no prior medical history although noted to be a daily smoker, takes no medications.  - No leukocytosis, UA normal, low grade temp noted.  - CT pelvis showed a fluid collection that was peripherally enhancing at the proximal inferior base of the penis consistent with abscess.  - Urology consulted for evaluation  - Patient NPO on IV fluids, morphine for pain, empiric vancomycin/Zosyn in the meantime        Nicotine dependence, cigarettes, uncomplicated  Nicotine patch ordered prn patient request - currently does not want it      VTE Risk Mitigation (From admission, onward)         Ordered     IP VTE HIGH RISK PATIENT  Once         10/08/23 0349     Place sequential compression device  Until discontinued         10/08/23 0349     Reason for No Pharmacological VTE Prophylaxis  Once        Question:  Reasons:  Answer:  Risk of Bleeding    10/08/23 0349                Discharge Planning   KATARZYNA:      Code Status: Full Code   Is the patient medically ready for discharge?:      Reason for patient still in hospital (select all that apply): Patient trending condition, Laboratory test, Treatment and Consult recommendations  Discharge Plan A: (P) Home with family                  Lucille Low MD  Department of Hospital Medicine   [facility] ([address])

## 2023-10-08 NOTE — SUBJECTIVE & OBJECTIVE
No past medical history on file.    No past surgical history on file.    Review of patient's allergies indicates:  No Known Allergies    Family History    None         Tobacco Use    Smoking status: Every Day    Smokeless tobacco: Never   Substance and Sexual Activity    Alcohol use: No    Drug use: Yes     Types: Marijuana     Comment: occasionally\    Sexual activity: Not Currently       Review of Systems   Constitutional:  Negative for chills and fever.   HENT:  Negative for hearing loss, sore throat and trouble swallowing.    Eyes: Negative.    Respiratory:  Negative for cough and shortness of breath.    Cardiovascular:  Negative for chest pain.   Gastrointestinal:  Negative for abdominal distention, abdominal pain, constipation, diarrhea, nausea and vomiting.   Genitourinary:  Negative for difficulty urinating, frequency and hematuria.        +perineal pain   Musculoskeletal:  Negative for arthralgias and neck pain.   Skin:  Negative for color change, pallor and rash.   Neurological:  Negative for dizziness and seizures.   Hematological:  Negative for adenopathy.   Psychiatric/Behavioral:  Negative for confusion.    All other systems reviewed and are negative.      Objective:     Temp:  [98.3 °F (36.8 °C)-99 °F (37.2 °C)] 98.3 °F (36.8 °C)  Pulse:  [74-91] 74  Resp:  [16-18] 16  SpO2:  [99 %] 99 %  BP: (129-176)/(84-92) 137/84  Weight: 123.2 kg (271 lb 9.7 oz)  Body mass index is 34.87 kg/m².           Drains       None                    Physical Exam  Vitals reviewed.   Constitutional:       General: He is not in acute distress.     Appearance: He is well-developed. He is not diaphoretic.   HENT:      Head: Normocephalic and atraumatic.   Eyes:      General: No scleral icterus.        Right eye: No discharge.         Left eye: No discharge.   Pulmonary:      Effort: Pulmonary effort is normal. No respiratory distress.   Abdominal:      General: Bowel sounds are normal. There is no distension.      Palpations:  "Abdomen is soft.      Tenderness: There is no abdominal tenderness. There is no guarding or rebound.   Genitourinary:     Comments: Induration noted in perineum with tenderness, no crepitus or fluctuance appreciated. No penile drainage. No scrotal involvement.   Musculoskeletal:         General: Normal range of motion.      Cervical back: Normal range of motion and neck supple.   Skin:     General: Skin is warm and dry.      Findings: No erythema.   Neurological:      Mental Status: He is alert and oriented to person, place, and time.   Psychiatric:         Mood and Affect: Mood normal.         Behavior: Behavior normal.          Significant Labs:    BMP:  Recent Labs   Lab 10/07/23  2354      K 4.0      CO2 22*   BUN 11   CREATININE 1.0   CALCIUM 9.5       CBC:  Recent Labs   Lab 10/07/23  2354   WBC 8.33   HGB 15.3   HCT 45.8          Blood Culture: No results for input(s): "LABBLOO" in the last 168 hours.  Urine Culture: No results for input(s): "LABURIN" in the last 168 hours.  Urine Studies:   Recent Labs   Lab 10/08/23  0254   COLORU Yellow   APPEARANCEUA Clear   PHUR 6.0   SPECGRAV 1.010   PROTEINUA Negative   GLUCUA Negative   KETONESU Negative   BILIRUBINUA Negative   OCCULTUA Negative   NITRITE Negative   UROBILINOGEN Negative   LEUKOCYTESUR Negative       Significant Imaging:  All pertinent imaging results/findings from the past 24 hours have been reviewed.    "

## 2023-10-08 NOTE — H&P
[facility]  History & Physical    Patient Name: Sinan Bear  MRN: [id number]  Patient Class: IP- Inpatient  Admission Date: 10/7/2023  Attending Physician: Lucille Caicedo MD   Primary Care Provider: Raisa, Primary Doctor         Patient information was obtained from patient, past medical records and ER records.     Subjective:     Principal Problem:Abscess    Chief Complaint:   Chief Complaint   Patient presents with    Abscess     Pt states that he has pain in between his penis and rectum, pt states it hurts to sit down, pt states he noticed in about 24 hours ago.         HPI: The patient is a 38 year old male with no significant past medical history who presents with a painful area between his penis and rectum.  He states the pain started in the last 24 hours with acute worsening over the last 3-4 hours.  CT done showing abscess at the base of the penis.  He will be admitted for further management of his abscess and Urology consult.      No past medical history on file.    No past surgical history on file.    Review of patient's allergies indicates:  No Known Allergies    No current facility-administered medications on file prior to encounter.     Current Outpatient Medications on File Prior to Encounter   Medication Sig    acetaminophen (TYLENOL) 500 MG tablet Take 2 tablets (1,000 mg total) by mouth every 8 (eight) hours as needed for Pain. (Patient not taking: Reported on 12/16/2019)    clindamycin (CLEOCIN) 300 MG capsule Take 1 capsule (300 mg total) by mouth every 8 (eight) hours.    HYDROcodone-acetaminophen (NORCO) 5-325 mg per tablet Take 1 tablet by mouth every 4 (four) hours as needed for Pain.    LIDOcaine (LIDODERM) 5 % Place 1 patch onto the skin once daily. Remove & Discard patch within 12 hours or as directed by MD    naproxen (NAPROSYN) 500 MG tablet Take 1 tablet (500 mg total) by mouth 2 (two) times daily as needed (pain). (Patient not taking: Reported  on 12/16/2019)    naproxen (NAPROSYN) 500 MG tablet Take 1 tablet (500 mg total) by mouth 2 (two) times daily with meals.    tamsulosin (FLOMAX) 0.4 mg Cap Take 1 capsule (0.4 mg total) by mouth once daily. (Patient not taking: Reported on 3/1/2021)     Family History    None       Tobacco Use    Smoking status: Every Day    Smokeless tobacco: Never   Substance and Sexual Activity    Alcohol use: No    Drug use: Yes     Types: Marijuana     Comment: occasionally\    Sexual activity: Not Currently     Review of Systems   Constitutional:  Negative for activity change, appetite change, fatigue and fever.   HENT:  Negative for congestion, ear pain and postnasal drip.    Eyes:  Negative for discharge.   Respiratory:  Negative for apnea, shortness of breath and wheezing.    Cardiovascular:  Negative for chest pain and leg swelling.   Gastrointestinal:  Negative for abdominal distention, abdominal pain, nausea and vomiting.   Endocrine: Negative for polydipsia, polyphagia and polyuria.   Genitourinary:  Positive for penile pain. Negative for difficulty urinating, flank pain, frequency, hematuria, scrotal swelling and urgency.   Musculoskeletal:  Negative for arthralgias and joint swelling.   Skin:  Negative for pallor and rash.   Allergic/Immunologic: Negative for environmental allergies and food allergies.   Neurological:  Negative for dizziness, speech difficulty, weakness, light-headedness and headaches.   Hematological:  Does not bruise/bleed easily.   Psychiatric/Behavioral:  Negative for agitation.      Objective:     Vital Signs (Most Recent):  Temp: 98.7 °F (37.1 °C) (10/07/23 2257)  Pulse: 91 (10/07/23 2254)  Resp: 18 (10/08/23 0300)  BP: (!) 176/92 (10/07/23 2254)  SpO2: 99 % (10/07/23 2254) Vital Signs (24h Range):  Temp:  [98.7 °F (37.1 °C)] 98.7 °F (37.1 °C)  Pulse:  [91] 91  Resp:  [18] 18  SpO2:  [99 %] 99 %  BP: (176)/(92) 176/92     Weight: 113.4 kg (250 lb)  Body mass index is 32.1 kg/m².      Physical Exam  Constitutional:       Appearance: Normal appearance. He is well-developed.   HENT:      Head: Normocephalic.   Eyes:      Conjunctiva/sclera: Conjunctivae normal.   Cardiovascular:      Rate and Rhythm: Regular rhythm. Tachycardia present.      Pulses:           Radial pulses are 2+ on the right side and 2+ on the left side.      Heart sounds: Normal heart sounds.   Pulmonary:      Effort: Pulmonary effort is normal.      Breath sounds: Examination of the left-lower field reveals decreased breath sounds. Decreased breath sounds present.   Abdominal:      General: Bowel sounds are normal. There is no distension.      Palpations: Abdomen is soft.      Tenderness: There is abdominal tenderness in the suprapubic area.   Musculoskeletal:         General: Normal range of motion.      Cervical back: Normal range of motion and neck supple.   Skin:     General: Skin is warm and dry.      Capillary Refill: Capillary refill takes less than 2 seconds.      Findings: Erythema and wound present.   Neurological:      Mental Status: He is alert and oriented to person, place, and time.      GCS: GCS eye subscore is 4. GCS verbal subscore is 5. GCS motor subscore is 6.      Motor: Motor function is intact.   Psychiatric:         Mood and Affect: Mood normal.         Speech: Speech normal.         Behavior: Behavior normal.                Significant Labs: All pertinent labs within the past 24 hours have been reviewed.  CBC:   Recent Labs   Lab 10/07/23  2354   WBC 8.33   HGB 15.3   HCT 45.8        CMP:   Recent Labs   Lab 10/07/23  2354      K 4.0      CO2 22*      BUN 11   CREATININE 1.0   CALCIUM 9.5   PROT 8.1   ALBUMIN 4.0   BILITOT 0.3   ALKPHOS 73   AST 19   ALT 23   ANIONGAP 11       Significant Imaging: I have reviewed all pertinent imaging results/findings within the past 24 hours.    Assessment/Plan:     * Abscess  CT- Peripherally enhancing collection within the central midline  pelvis located at the proximal inferior base of the penis as discussed above.  Findings are concerning for abscess, possibly at the base of the penis and/or periurethral in location.  No evidence of subcutaneous emphysema.  Appropriate subspecialty consultation is advised.  Mild nonspecific urinary bladder wall thickening, most pronounced at the bladder base.  This may be reactive, although correlation with urinalysis advised.  Further assessment with cystoscopy as warranted.  Scattered colonic diverticula evidence of acute diverticulitis.    Consult Urology  IVF  NPO  [place]/Morphine/Zofran  Zosyn/Vanc      VTE Risk Mitigation (From admission, onward)         Ordered     IP VTE HIGH RISK PATIENT  Once         10/08/23 0349     Place sequential compression device  Until discontinued         10/08/23 0349     Reason for No Pharmacological VTE Prophylaxis  Once        Question:  Reasons:  Answer:  Risk of Bleeding    10/08/23 0349                           Joon Cintron NP  Department of Hospital Medicine  [facility] - Emergency Dept

## 2023-10-08 NOTE — ASSESSMENT & PLAN NOTE
- Fluid collected noted in perineum on CT. Collection appears immediately adjacent to urethra and appears deep to skin level. Induration is palpable on exam. No s/s Carmella's.   - Discussed concern for I&D of perineum as risk of urethral cutaneous fistula due to proximity of the urethra.    - Recommend IV abx for now with serial exams. May need repeat imaging. Discussed possible procedures of perineal I&D vs cystoscopy.    - Okay for PO today. NPO again at [place].

## 2023-10-08 NOTE — PROGRESS NOTES
"Pharmacokinetic Initial Assessment: IV Vancomycin    Assessment/Plan:    Initiate intravenous vancomycin with loading dose of 2000 mg once followed by a maintenance dose of vancomycin 2000 mg IV every 12 hours  Desired empiric serum trough concentration is 10 to 20 mcg/mL  Draw vancomycin trough level 30 min prior to fourth dose on 10/9/23 at approximately 1630  Pharmacy will continue to follow and monitor vancomycin.      Please contact pharmacy at extension [phone] with any questions regarding this assessment.     Thank you for the consult,   Ale Rhonda       Patient brief summary:  Sinan Bear is a 38 y.o. male initiated on antimicrobial therapy with IV Vancomycin for treatment of suspected skin & soft tissue infection    Drug Allergies:   Review of patient's allergies indicates:  No Known Allergies    Actual Body Weight:   123.2 kg    Renal Function:   Estimated Creatinine Clearance: 136.9 mL/min (based on SCr of 1 mg/dL).,     Dialysis Method (if applicable):  N/A    CBC (last 72 hours):  Recent Labs   Lab Result Units 10/07/23  2354   WBC K/uL 8.33   Hemoglobin g/dL 15.3   Hematocrit % 45.8   Platelets K/uL 270   Gran % % 49.9   Lymph % % 36.9   Mono % % 8.3   Eosinophil % % 4.1   Basophil % % 0.4   Differential Method  Automated       Metabolic Panel (last 72 hours):  Recent Labs   Lab Result Units 10/07/23  2354 10/08/23  0254   Sodium mmol/L 140  --    Potassium mmol/L 4.0  --    Chloride mmol/L 107  --    CO2 mmol/L 22*  --    Glucose mg/dL 103  --    Glucose, UA   --  Negative   BUN mg/dL 11  --    Creatinine mg/dL 1.0  --    Albumin g/dL 4.0  --    Total Bilirubin mg/dL 0.3  --    Alkaline Phosphatase U/L 73  --    AST U/L 19  --    ALT U/L 23  --        Drug levels (last 3 results):  No results for input(s): "VANCOMYCINRA", "VANCORANDOM", "VANCOMYCINPE", "VANCOPEAK", "VANCOMYCINTR", "VANCOTROUGH" in the last 72 hours.    Microbiologic Results:  Microbiology Results (last 7 days)       ** No " results found for the last 168 hours. **

## 2023-10-08 NOTE — HPI
"From H&P by Joon Cintron NP:  "The patient is a 38 year old male with no significant past medical history who presents with a painful area between his penis and rectum.  He states the pain started in the last 24 hours with acute worsening over the last 3-4 hours.  CT done showing abscess at the base of the penis.  He will be admitted for further management of his abscess and Urology consult."  "

## 2023-10-08 NOTE — HOSPITAL COURSE
Patient made NPO and admitted on empiric IV antibiotics (vancomycin and Zosyn) and Urology was consulted for evaluation.  He was watched for a day on IV antibiotics but as there was no clear improvement they qplanned to take him to the OR on 10/9.  Collection was immediately adjacent to the urethra so there was a concern for I&D of the perineum given risk of urethral cutaneous fistula developing due to the urethral proximity.    Patient underwent cystoscopy on 10/9 with Dr. Shelley.  Urethral stricture was seen requiring dilation.  The perineal abscess was aspirated and about 2 cc purulent drainage sent for culture.  Possibly this was a periurethral abscess associated with the stricture.  Schuster catheter was placed following the procedure.  Empiric antibiotics were continued while awaiting culture results. Cultures had no growth and after discussion with urology it was decided on 14 days course of ciprofloxacin, discharge with schuster to allow area around stricture to heal and follow up with urology in 1 week.

## 2023-10-08 NOTE — ASSESSMENT & PLAN NOTE
CT- Peripherally enhancing collection within the central midline pelvis located at the proximal inferior base of the penis as discussed above.  Findings are concerning for abscess, possibly at the base of the penis and/or periurethral in location.  No evidence of subcutaneous emphysema.  Appropriate subspecialty consultation is advised.  Mild nonspecific urinary bladder wall thickening, most pronounced at the bladder base.  This may be reactive, although correlation with urinalysis advised.  Further assessment with cystoscopy as warranted.  Scattered colonic diverticula evidence of acute diverticulitis.    Consult Urology  IVF  NPO  [place]/Morphine/Zofran  Zosyn/Vanc

## 2023-10-08 NOTE — HPI
"39yo M with several day history of perineal pain. He was seen at outside ER 2 days ago and diagnosed with prostatitis and given Cipro. Returns with worsening pain. No fevers. CT showed fluid collection deep in perineum adjacent to the bulbar urethra. Denies penile discharge. Denies dysuria, hematuria. Slow urinary stream occurred years ago, no change in stream recently. Denies fever. No h/o STIs. He is not diabetic. He does have h/o recurrent "boils" in inguinal/buttock area that have been I&Ded in the past. He was seen by  NP in 2019 with perineal pain - treated for prostatitis.     UA negative. WBC normal. Cr 1.0.      "

## 2023-10-08 NOTE — CONSULTS
"[facility] ([address])  Urology  Consult Note    Patient Name: Sinan Bear  MRN: [id number]  Admission Date: 10/7/2023  Hospital Length of Stay: 0   Code Status: Full Code   Attending Provider: Lucille Caicedo MD   Consulting Provider: Blanca Hickey MD  Primary Care Physician: No, Primary Doctor  Principal Problem:Perineal abscess    Inpatient consult to Urology  Consult performed by: Blanca Hickey MD  Consult ordered by: Joon Cintron NP          Subjective:     HPI:  39yo M with several day history of perineal pain. He was seen at outside ER 2 days ago and diagnosed with prostatitis and given Cipro. Returns with worsening pain. No fevers. CT showed fluid collection deep in perineum adjacent to the bulbar urethra. Denies penile discharge. Denies dysuria, hematuria. Slow urinary stream occurred years ago, no change in stream recently. Denies fever. No h/o STIs. He is not diabetic. He does have h/o recurrent "boils" in inguinal/buttock area that have been I&Ded in the past. He was seen by  NP in 2019 with perineal pain - treated for prostatitis.     UA negative. WBC normal. Cr 1.0.          No past medical history on file.    No past surgical history on file.    Review of patient's allergies indicates:  No Known Allergies    Family History    None         Tobacco Use    Smoking status: Every Day    Smokeless tobacco: Never   Substance and Sexual Activity    Alcohol use: No    Drug use: Yes     Types: Marijuana     Comment: occasionally\    Sexual activity: Not Currently       Review of Systems   Constitutional:  Negative for chills and fever.   HENT:  Negative for hearing loss, sore throat and trouble swallowing.    Eyes: Negative.    Respiratory:  Negative for cough and shortness of breath.    Cardiovascular:  Negative for chest pain.   Gastrointestinal:  Negative for abdominal distention, abdominal pain, constipation, diarrhea, nausea and vomiting.   Genitourinary:  " Negative for difficulty urinating, frequency and hematuria.        +perineal pain   Musculoskeletal:  Negative for arthralgias and neck pain.   Skin:  Negative for color change, pallor and rash.   Neurological:  Negative for dizziness and seizures.   Hematological:  Negative for adenopathy.   Psychiatric/Behavioral:  Negative for confusion.    All other systems reviewed and are negative.      Objective:     Temp:  [98.3 °F (36.8 °C)-99 °F (37.2 °C)] 98.3 °F (36.8 °C)  Pulse:  [74-91] 74  Resp:  [16-18] 16  SpO2:  [99 %] 99 %  BP: (129-176)/(84-92) 137/84  Weight: 123.2 kg (271 lb 9.7 oz)  Body mass index is 34.87 kg/m².           Drains       None                    Physical Exam  Vitals reviewed.   Constitutional:       General: He is not in acute distress.     Appearance: He is well-developed. He is not diaphoretic.   HENT:      Head: Normocephalic and atraumatic.   Eyes:      General: No scleral icterus.        Right eye: No discharge.         Left eye: No discharge.   Pulmonary:      Effort: Pulmonary effort is normal. No respiratory distress.   Abdominal:      General: Bowel sounds are normal. There is no distension.      Palpations: Abdomen is soft.      Tenderness: There is no abdominal tenderness. There is no guarding or rebound.   Genitourinary:     Comments: Induration noted in perineum with tenderness, no crepitus or fluctuance appreciated. No penile drainage. No scrotal involvement.   Musculoskeletal:         General: Normal range of motion.      Cervical back: Normal range of motion and neck supple.   Skin:     General: Skin is warm and dry.      Findings: No erythema.   Neurological:      Mental Status: He is alert and oriented to person, place, and time.   Psychiatric:         Mood and Affect: Mood normal.         Behavior: Behavior normal.          Significant Labs:    BMP:  Recent Labs   Lab 10/07/23  2354      K 4.0      CO2 22*   BUN 11   CREATININE 1.0   CALCIUM 9.5       CBC:  Recent  "Labs   Lab 10/07/23  2354   WBC 8.33   HGB 15.3   HCT 45.8          Blood Culture: No results for input(s): "LABBLOO" in the last 168 hours.  Urine Culture: No results for input(s): "LABURIN" in the last 168 hours.  Urine Studies:   Recent Labs   Lab 10/08/23  0254   COLORU Yellow   APPEARANCEUA Clear   PHUR 6.0   SPECGRAV 1.010   PROTEINUA Negative   GLUCUA Negative   KETONESU Negative   BILIRUBINUA Negative   OCCULTUA Negative   NITRITE Negative   UROBILINOGEN Negative   LEUKOCYTESUR Negative       Significant Imaging:  All pertinent imaging results/findings from the past 24 hours have been reviewed.        Assessment and Plan:     * Perineal abscess   - Fluid collected noted in perineum on CT. Collection appears immediately adjacent to urethra and appears deep to skin level. Induration is palpable on exam. No s/s Carmella's.   - Discussed concern for I&D of perineum as risk of urethral cutaneous fistula due to proximity of the urethra.    - Recommend IV abx for now with serial exams. May need repeat imaging. Discussed possible procedures of perineal I&D vs cystoscopy.    - Okay for PO today. NPO again at [place].         VTE Risk Mitigation (From admission, onward)         Ordered     IP VTE HIGH RISK PATIENT  Once         10/08/23 0349     Place sequential compression device  Until discontinued         10/08/23 0349     Reason for No Pharmacological VTE Prophylaxis  Once        Question:  Reasons:  Answer:  Risk of Bleeding    10/08/23 0349                Thank you for your consult. I will follow-up with patient. Please contact us if you have any additional questions.    Blanca Hickey MD  Urology  [facility] ([address])  "

## 2023-10-08 NOTE — SUBJECTIVE & OBJECTIVE
No past medical history on file.    No past surgical history on file.    Review of patient's allergies indicates:  No Known Allergies    No current facility-administered medications on file prior to encounter.     Current Outpatient Medications on File Prior to Encounter   Medication Sig    acetaminophen (TYLENOL) 500 MG tablet Take 2 tablets (1,000 mg total) by mouth every 8 (eight) hours as needed for Pain. (Patient not taking: Reported on 12/16/2019)    clindamycin (CLEOCIN) 300 MG capsule Take 1 capsule (300 mg total) by mouth every 8 (eight) hours.    HYDROcodone-acetaminophen (NORCO) 5-325 mg per tablet Take 1 tablet by mouth every 4 (four) hours as needed for Pain.    LIDOcaine (LIDODERM) 5 % Place 1 patch onto the skin once daily. Remove & Discard patch within 12 hours or as directed by MD    naproxen (NAPROSYN) 500 MG tablet Take 1 tablet (500 mg total) by mouth 2 (two) times daily as needed (pain). (Patient not taking: Reported on 12/16/2019)    naproxen (NAPROSYN) 500 MG tablet Take 1 tablet (500 mg total) by mouth 2 (two) times daily with meals.    tamsulosin (FLOMAX) 0.4 mg Cap Take 1 capsule (0.4 mg total) by mouth once daily. (Patient not taking: Reported on 3/1/2021)     Family History    None       Tobacco Use    Smoking status: Every Day    Smokeless tobacco: Never   Substance and Sexual Activity    Alcohol use: No    Drug use: Yes     Types: Marijuana     Comment: occasionally\    Sexual activity: Not Currently     Review of Systems   Constitutional:  Negative for activity change, appetite change, fatigue and fever.   HENT:  Negative for congestion, ear pain and postnasal drip.    Eyes:  Negative for discharge.   Respiratory:  Negative for apnea, shortness of breath and wheezing.    Cardiovascular:  Negative for chest pain and leg swelling.   Gastrointestinal:  Negative for abdominal distention, abdominal pain, nausea and vomiting.   Endocrine: Negative for polydipsia, polyphagia and polyuria.    Genitourinary:  Positive for penile pain. Negative for difficulty urinating, flank pain, frequency, hematuria, scrotal swelling and urgency.   Musculoskeletal:  Negative for arthralgias and joint swelling.   Skin:  Negative for pallor and rash.   Allergic/Immunologic: Negative for environmental allergies and food allergies.   Neurological:  Negative for dizziness, speech difficulty, weakness, light-headedness and headaches.   Hematological:  Does not bruise/bleed easily.   Psychiatric/Behavioral:  Negative for agitation.      Objective:     Vital Signs (Most Recent):  Temp: 98.7 °F (37.1 °C) (10/07/23 2257)  Pulse: 91 (10/07/23 2254)  Resp: 18 (10/08/23 0300)  BP: (!) 176/92 (10/07/23 2254)  SpO2: 99 % (10/07/23 2254) Vital Signs (24h Range):  Temp:  [98.7 °F (37.1 °C)] 98.7 °F (37.1 °C)  Pulse:  [91] 91  Resp:  [18] 18  SpO2:  [99 %] 99 %  BP: (176)/(92) 176/92     Weight: 113.4 kg (250 lb)  Body mass index is 32.1 kg/m².     Physical Exam  Constitutional:       Appearance: Normal appearance. He is well-developed.   HENT:      Head: Normocephalic.   Eyes:      Conjunctiva/sclera: Conjunctivae normal.   Cardiovascular:      Rate and Rhythm: Regular rhythm. Tachycardia present.      Pulses:           Radial pulses are 2+ on the right side and 2+ on the left side.      Heart sounds: Normal heart sounds.   Pulmonary:      Effort: Pulmonary effort is normal.      Breath sounds: Examination of the left-lower field reveals decreased breath sounds. Decreased breath sounds present.   Abdominal:      General: Bowel sounds are normal. There is no distension.      Palpations: Abdomen is soft.      Tenderness: There is abdominal tenderness in the suprapubic area.   Musculoskeletal:         General: Normal range of motion.      Cervical back: Normal range of motion and neck supple.   Skin:     General: Skin is warm and dry.      Capillary Refill: Capillary refill takes less than 2 seconds.      Findings: Erythema and wound  present.   Neurological:      Mental Status: He is alert and oriented to person, place, and time.      GCS: GCS eye subscore is 4. GCS verbal subscore is 5. GCS motor subscore is 6.      Motor: Motor function is intact.   Psychiatric:         Mood and Affect: Mood normal.         Speech: Speech normal.         Behavior: Behavior normal.                Significant Labs: All pertinent labs within the past 24 hours have been reviewed.  CBC:   Recent Labs   Lab 10/07/23  2354   WBC 8.33   HGB 15.3   HCT 45.8        CMP:   Recent Labs   Lab 10/07/23  2354      K 4.0      CO2 22*      BUN 11   CREATININE 1.0   CALCIUM 9.5   PROT 8.1   ALBUMIN 4.0   BILITOT 0.3   ALKPHOS 73   AST 19   ALT 23   ANIONGAP 11       Significant Imaging: I have reviewed all pertinent imaging results/findings within the past 24 hours.

## 2023-10-08 NOTE — NURSING
Nurses Note -- 4 Eyes      10/8/2023   5:48 AM      Skin assessed during: Admit      [x] No Altered Skin Integrity Present    []Prevention Measures Documented      [] Yes- Altered Skin Integrity Present or Discovered   [] LDA Added if Not in Epic (Describe Wound)   [] New Altered Skin Integrity was Present on Admit and Documented in LDA   [] Wound Image Taken    Wound Care Consulted? No    Attending Nurse:  Tati Maldonado RN    Second RN/Staff Member:  AYAAN Crump

## 2023-10-08 NOTE — SUBJECTIVE & OBJECTIVE
Interval History: Patient is new to me.  He has no complaints - has some pain in the perineal area but does not want more pain medication.  No nausea.  He refused to have labs drawn this AM.  He confirms that he has no medical problems, takes no medications but does smoke cigarettes every day.  Nicotine patch was ordered but he declined it.    Review of Systems   Constitutional:  Negative for chills and fever.   Respiratory:  Negative for cough and shortness of breath.    Cardiovascular:  Negative for chest pain and palpitations.   Genitourinary:         Pain in perineal area     Objective:     Vital Signs (Most Recent):  Temp: 98.3 °F (36.8 °C) (10/08/23 0704)  Pulse: 74 (10/08/23 0704)  Resp: 16 (10/08/23 0704)  BP: 137/84 (10/08/23 0704)  SpO2: 99 % (10/08/23 0704) Vital Signs (24h Range):  Temp:  [98.3 °F (36.8 °C)-99 °F (37.2 °C)] 98.3 °F (36.8 °C)  Pulse:  [74-91] 74  Resp:  [16-18] 16  SpO2:  [99 %] 99 %  BP: (129-176)/(84-92) 137/84     Weight: 123.2 kg (271 lb 9.7 oz)  Body mass index is 34.87 kg/m².  No intake or output data in the 24 hours ending 10/08/23 1016      Physical Exam  Constitutional:       Comments: Appears uncomfortable   Cardiovascular:      Rate and Rhythm: Normal rate and regular rhythm.      Pulses: Normal pulses.      Heart sounds: Normal heart sounds. No murmur heard.     No gallop.   Pulmonary:      Effort: Pulmonary effort is normal.      Breath sounds: Normal breath sounds.   Abdominal:      General: Bowel sounds are normal.      Palpations: Abdomen is soft.   Skin:     General: Skin is warm and dry.   Neurological:      General: No focal deficit present.      Mental Status: He is alert and oriented to person, place, and time.             Significant Labs: All pertinent labs within the past 24 hours have been reviewed.    Significant Imaging: I have reviewed all pertinent imaging results/findings within the past 24 hours.

## 2023-10-08 NOTE — ED PROVIDER NOTES
"Encounter Date: 10/7/2023       History     Chief Complaint   Patient presents with    Abscess     Pt states that he has pain in between his penis and rectum, pt states it hurts to sit down, pt states he noticed in about 24 hours ago.      This is a 38-year-old man who presents for evaluation of pain and swelling in what he describes as "his perineum".  Notes that it started today, has become increasingly painful.  Denies any episodes like this in the past, nothing in particular seems make any better it seems to be worsening with time.  He denies any fevers, chills, has some discomfort when moving his bowels but denies any bloody bowel movements or mucousy stools.  Denies any dysuria or testicular pain.      Review of patient's allergies indicates:  No Known Allergies  No past medical history on file.  No past surgical history on file.  No family history on file.  Social History     Tobacco Use    Smoking status: Every Day    Smokeless tobacco: Never   Substance Use Topics    Alcohol use: No    Drug use: Yes     Types: Marijuana     Comment: occasionally\     Review of Systems  Constitutional-no fever  HEENT-no congestion  Eyes-no redness  Respiratory-no shortness of breath  Cardio-no chest pain  GI-no abdominal pain  Endocrine-no cold intolerance  -positive swelling in the perineum  MSK-no myalgias  Skin-no rashes  Allergy-no environmental allergy  Neurologic-, no headache  Hematology-no swollen nodes  Behavioral-no confusion  Physical Exam     Initial Vitals   BP Pulse Resp Temp SpO2   10/07/23 2254 10/07/23 2254 10/07/23 2254 10/07/23 2257 10/07/23 2254   (!) 176/92 91 18 98.7 °F (37.1 °C) 99 %      MAP       --                Physical Exam    Genitourinary:             Constitutional: Well appearing, no distress.  Eyes: Conjunctivae normal.  ENT       Head: Normocephalic, atraumatic.       Nose: Normal external appearance        Mouth/Throat: no strigulous respirations   Hematological/Lymphatic/Immunilogical: " no visible lymphadenopathy   Cardiovascular: Normal rate,   Respiratory: Normal respiratory effort.   Gastrointestinal: non distended   Musculoskeletal: Normal range of motion in all extremities. No obvious deformities or swelling.  Neurologic: Alert, oriented. Normal speech and language. No gross focal neurologic deficits are appreciated.  Skin: Skin is warm, dry. No rash noted.  Psychiatric: Mood and affect are normal.   ED Course   Procedures  Labs Reviewed   CBC W/ AUTO DIFFERENTIAL - Abnormal; Notable for the following components:       Result Value    MCH 31.2 (*)     MPV 8.6 (*)     All other components within normal limits   COMPREHENSIVE METABOLIC PANEL - Abnormal; Notable for the following components:    CO2 22 (*)     All other components within normal limits   HIV 1 / 2 ANTIBODY    Narrative:     Release to patient->Immediate   HEPATITIS C ANTIBODY    Narrative:     Release to patient->Immediate   URINALYSIS, REFLEX TO URINE CULTURE    Narrative:     Specimen Source->Urine          Imaging Results               CT Pelvis With Contrast (Final result)  Result time 10/08/23 02:22:56      Final result by Theron Segura MD (10/08/23 02:22:56)                   Impression:      Peripherally enhancing collection within the central midline pelvis located at the proximal inferior base of the penis as discussed above.  Findings are concerning for abscess, possibly at the base of the penis and/or periurethral in location.  No evidence of subcutaneous emphysema.  Appropriate subspecialty consultation is advised.    Mild nonspecific urinary bladder wall thickening, most pronounced at the bladder base.  This may be reactive, although correlation with urinalysis advised.  Further assessment with cystoscopy as warranted.    Scattered colonic diverticula evidence of acute diverticulitis.    This report was flagged in Epic as abnormal.      Electronically signed by: Theron Segura  MD  Date:    10/08/2023  Time:    02:22               Narrative:    EXAMINATION:  CT PELVIS WITH  CONTRAST    CLINICAL HISTORY:  Soft tissue mass, groin, deep;Anal/rectal abscess;    TECHNIQUE:  3.75 mm axial images were acquired of the pelvis following the administration of 100 cc Omnipaque 350 IV contrast.  Sagittal and coronal reformatted images were reviewed.    COMPARISON:  None    FINDINGS:  Within the central midline pelvis, there is a mildly enhancing, thick-walled irregular collection measuring 4.4 x 3.1 x 3.8 cm (AP by transverse by craniocaudal dimensions; axial series 2, image 113).  This is located at the proximal inferior base of the penis along or inferior to the bulbar urethra.  Findings are concerning for abscess.  There is mild surrounding soft tissue induration.  No evidence of subcutaneous emphysema.  The bladder demonstrates mild nonspecific wall thickening, most pronounced at the base of the bladder.  The prostate does not appear significantly enlarged.  The prostate demonstrates relatively homogeneous enhancement.  There is no significant free fluid within the pelvis.    The visualized portions of the kidneys are unremarkable.  Distal infrarenal abdominal aorta is unremarkable.  There are scattered colonic diverticula without evidence of acute diverticulitis.  The appendix appears within normal limits.  No significant rectal wall thickening appreciated.  There are scattered upper limit of normal bilateral inguinal lymph nodes.    Visualized osseous structures demonstrate mild degenerative changes.                                       Medications   ondansetron injection 4 mg (has no administration in time range)   HYDROcodone-acetaminophen 5-325 mg per tablet 1 tablet (has no administration in time range)   sodium chloride 0.9% flush 10 mL (has no administration in time range)   acetaminophen tablet 650 mg (has no administration in time range)   naloxone 0.4 mg/mL injection 0.02 mg (has no  administration in time range)   0.9%  NaCl infusion ( Intravenous New Bag 10/8/23 0429)   morphine injection 4 mg (4 mg Intravenous Given 10/8/23 1340)   ondansetron injection 4 mg (has no administration in time range)   piperacillin-tazobactam (ZOSYN) 4.5 g in dextrose 5 % in water (D5W) 100 mL IVPB (MB+) (4.5 g Intravenous New Bag 10/8/23 1214)   vancomycin - pharmacy to dose (has no administration in time range)   nicotine 21 mg/24 hr 1 patch (1 patch Transdermal Not Given 10/8/23 0900)   vancomycin 2 g in dextrose 5 % 500 mL IVPB (2,000 mg Intravenous Trough Due As Scheduled Before Dose 10/9/23 1630)   iohexoL (OMNIPAQUE 350) injection 100 mL (100 mLs Intravenous Given 10/8/23 0118)   piperacillin-tazobactam (ZOSYN) 4.5 g in dextrose 5 % in water (D5W) 100 mL IVPB (MB+) (0 g Intravenous Stopped 10/8/23 0346)   vancomycin 2 g in dextrose 5 % 500 mL IVPB (0 mg Intravenous Stopped 10/8/23 0717)     Medical Decision Making  Differential diagnosis-prostatitis, urethritis, scrotal abscess, Carmella's gangrene, deep space abscess, perirectal abscess, tobacco abuse    This gentleman has somewhat limited health literacy, has not received regular care.  Given the place where he is mildly tender at the base of the scrotum along the left side of the perineum have a concern for potential developing Carmella's gangrene as he may have an undiagnosed diabetes.  Labs have been obtained which were unrevealing overall however imaging is concerning for deep space abscess near the penis.    Have discussed the case with the on-call urologist Dr. Hickey, will initiate antibiotic treatment for this gentleman and plan for observation in the hospital as to be evaluated for further evaluation and consideration of intervention as needed.    Have discussed with the patient multiple times, analgesics have been administered.        Problems Addressed:  Elevated BP without diagnosis of hypertension: undiagnosed new problem with uncertain  prognosis  Penile abscess: acute illness or injury  Prostatitis, unspecified prostatitis type: acute illness or injury  Tobacco abuse: chronic illness or injury    Amount and/or Complexity of Data Reviewed  External Data Reviewed: labs, radiology and notes.     Details: Previous encounter for years prior for prostatitis  Labs: ordered.  Radiology: ordered.    Risk  OTC drugs.  Prescription drug management.  Decision regarding hospitalization.                               Clinical Impression:   Final diagnoses:  [N41.9] Prostatitis, unspecified prostatitis type (Primary)  [R03.0] Elevated BP without diagnosis of hypertension  [N48.21] Penile abscess  [Z72.0] Tobacco abuse        ED Disposition Condition    Admit Stable                Luis Manuel Wade MD  10/08/23 2635

## 2023-10-09 ENCOUNTER — ANESTHESIA (OUTPATIENT)
Dept: SURGERY | Facility: OTHER | Age: 38
DRG: 603 | End: 2023-10-09
Payer: COMMERCIAL

## 2023-10-09 ENCOUNTER — ANESTHESIA EVENT (OUTPATIENT)
Dept: SURGERY | Facility: OTHER | Age: 38
DRG: 603 | End: 2023-10-09
Payer: COMMERCIAL

## 2023-10-09 PROBLEM — N35.912 STRICTURE OF BULBOUS URETHRA IN MALE: Status: ACTIVE | Noted: 2023-10-09

## 2023-10-09 LAB
ALBUMIN SERPL BCP-MCNC: 3.9 G/DL (ref 3.5–5.2)
ALP SERPL-CCNC: 68 U/L (ref 55–135)
ALT SERPL W/O P-5'-P-CCNC: 19 U/L (ref 10–44)
ANION GAP SERPL CALC-SCNC: 9 MMOL/L (ref 8–16)
AST SERPL-CCNC: 16 U/L (ref 10–40)
BASOPHILS # BLD AUTO: 0.01 K/UL (ref 0–0.2)
BASOPHILS NFR BLD: 0.1 % (ref 0–1.9)
BILIRUB SERPL-MCNC: 0.4 MG/DL (ref 0.1–1)
BUN SERPL-MCNC: 8 MG/DL (ref 6–20)
CALCIUM SERPL-MCNC: 9.6 MG/DL (ref 8.7–10.5)
CHLORIDE SERPL-SCNC: 105 MMOL/L (ref 95–110)
CO2 SERPL-SCNC: 24 MMOL/L (ref 23–29)
CREAT SERPL-MCNC: 1.1 MG/DL (ref 0.5–1.4)
DIFFERENTIAL METHOD: ABNORMAL
EOSINOPHIL # BLD AUTO: 0 K/UL (ref 0–0.5)
EOSINOPHIL NFR BLD: 0.5 % (ref 0–8)
ERYTHROCYTE [DISTWIDTH] IN BLOOD BY AUTOMATED COUNT: 12.9 % (ref 11.5–14.5)
EST. GFR  (NO RACE VARIABLE): >60 ML/MIN/1.73 M^2
GLUCOSE SERPL-MCNC: 108 MG/DL (ref 70–110)
GRAM STN SPEC: NORMAL
GRAM STN SPEC: NORMAL
HCT VFR BLD AUTO: 47 % (ref 40–54)
HGB BLD-MCNC: 15.5 G/DL (ref 14–18)
IMM GRANULOCYTES # BLD AUTO: 0.01 K/UL (ref 0–0.04)
IMM GRANULOCYTES NFR BLD AUTO: 0.1 % (ref 0–0.5)
LYMPHOCYTES # BLD AUTO: 1 K/UL (ref 1–4.8)
LYMPHOCYTES NFR BLD: 12.2 % (ref 18–48)
MAGNESIUM SERPL-MCNC: 2.2 MG/DL (ref 1.6–2.6)
MCH RBC QN AUTO: 30.7 PG (ref 27–31)
MCHC RBC AUTO-ENTMCNC: 33 G/DL (ref 32–36)
MCV RBC AUTO: 93 FL (ref 82–98)
MONOCYTES # BLD AUTO: 0.2 K/UL (ref 0.3–1)
MONOCYTES NFR BLD: 2.3 % (ref 4–15)
NEUTROPHILS # BLD AUTO: 6.6 K/UL (ref 1.8–7.7)
NEUTROPHILS NFR BLD: 84.8 % (ref 38–73)
NRBC BLD-RTO: 0 /100 WBC
PHOSPHATE SERPL-MCNC: 2.1 MG/DL (ref 2.7–4.5)
PLATELET # BLD AUTO: 289 K/UL (ref 150–450)
PMV BLD AUTO: 8.5 FL (ref 9.2–12.9)
POTASSIUM SERPL-SCNC: 4.6 MMOL/L (ref 3.5–5.1)
PROT SERPL-MCNC: 8 G/DL (ref 6–8.4)
RBC # BLD AUTO: 5.05 M/UL (ref 4.6–6.2)
SODIUM SERPL-SCNC: 138 MMOL/L (ref 136–145)
VANCOMYCIN TROUGH SERPL-MCNC: 9.4 UG/ML (ref 10–22)
WBC # BLD AUTO: 7.78 K/UL (ref 3.9–12.7)

## 2023-10-09 PROCEDURE — 25000003 PHARM REV CODE 250: Performed by: PHYSICIAN ASSISTANT

## 2023-10-09 PROCEDURE — 25000242 PHARM REV CODE 250 ALT 637 W/ HCPCS: Performed by: ANESTHESIOLOGY

## 2023-10-09 PROCEDURE — D9220A PRA ANESTHESIA: Mod: CRNA,,, | Performed by: NURSE ANESTHETIST, CERTIFIED REGISTERED

## 2023-10-09 PROCEDURE — 11000001 HC ACUTE MED/SURG PRIVATE ROOM

## 2023-10-09 PROCEDURE — 27201423 OPTIME MED/SURG SUP & DEVICES STERILE SUPPLY: Performed by: UROLOGY

## 2023-10-09 PROCEDURE — 80053 COMPREHEN METABOLIC PANEL: CPT | Performed by: NURSE PRACTITIONER

## 2023-10-09 PROCEDURE — 99233 SBSQ HOSP IP/OBS HIGH 50: CPT | Mod: ,,, | Performed by: HOSPITALIST

## 2023-10-09 PROCEDURE — 37000008 HC ANESTHESIA 1ST 15 MINUTES: Performed by: UROLOGY

## 2023-10-09 PROCEDURE — 87075 CULTR BACTERIA EXCEPT BLOOD: CPT | Performed by: HOSPITALIST

## 2023-10-09 PROCEDURE — 36000706: Performed by: UROLOGY

## 2023-10-09 PROCEDURE — C1769 GUIDE WIRE: HCPCS | Performed by: UROLOGY

## 2023-10-09 PROCEDURE — 84100 ASSAY OF PHOSPHORUS: CPT | Performed by: NURSE PRACTITIONER

## 2023-10-09 PROCEDURE — 63600175 PHARM REV CODE 636 W HCPCS: Performed by: NURSE ANESTHETIST, CERTIFIED REGISTERED

## 2023-10-09 PROCEDURE — 85025 COMPLETE CBC W/AUTO DIFF WBC: CPT | Performed by: NURSE PRACTITIONER

## 2023-10-09 PROCEDURE — 37000009 HC ANESTHESIA EA ADD 15 MINS: Performed by: UROLOGY

## 2023-10-09 PROCEDURE — 36415 COLL VENOUS BLD VENIPUNCTURE: CPT | Performed by: NURSE PRACTITIONER

## 2023-10-09 PROCEDURE — 10160 PNXR ASPIR ABSC HMTMA BULLA: CPT | Mod: 51,,, | Performed by: UROLOGY

## 2023-10-09 PROCEDURE — 25000003 PHARM REV CODE 250: Performed by: NURSE ANESTHETIST, CERTIFIED REGISTERED

## 2023-10-09 PROCEDURE — 63600175 PHARM REV CODE 636 W HCPCS: Performed by: HOSPITALIST

## 2023-10-09 PROCEDURE — D9220A PRA ANESTHESIA: ICD-10-PCS | Mod: CRNA,,, | Performed by: NURSE ANESTHETIST, CERTIFIED REGISTERED

## 2023-10-09 PROCEDURE — 36415 COLL VENOUS BLD VENIPUNCTURE: CPT | Performed by: HOSPITALIST

## 2023-10-09 PROCEDURE — 83735 ASSAY OF MAGNESIUM: CPT | Performed by: NURSE PRACTITIONER

## 2023-10-09 PROCEDURE — 94761 N-INVAS EAR/PLS OXIMETRY MLT: CPT

## 2023-10-09 PROCEDURE — 52281 CYSTOSCOPY AND TREATMENT: CPT | Mod: ,,, | Performed by: UROLOGY

## 2023-10-09 PROCEDURE — 87070 CULTURE OTHR SPECIMN AEROBIC: CPT | Performed by: HOSPITALIST

## 2023-10-09 PROCEDURE — 52281 PR CYSTOSCOPY,DIL URETHRAL STRICTURE: ICD-10-PCS | Mod: ,,, | Performed by: UROLOGY

## 2023-10-09 PROCEDURE — 36000707: Performed by: UROLOGY

## 2023-10-09 PROCEDURE — 87205 SMEAR GRAM STAIN: CPT | Performed by: HOSPITALIST

## 2023-10-09 PROCEDURE — 25000003 PHARM REV CODE 250: Performed by: NURSE PRACTITIONER

## 2023-10-09 PROCEDURE — 99233 PR SUBSEQUENT HOSPITAL CARE,LEVL III: ICD-10-PCS | Mod: ,,, | Performed by: HOSPITALIST

## 2023-10-09 PROCEDURE — 71000039 HC RECOVERY, EACH ADD'L HOUR: Performed by: UROLOGY

## 2023-10-09 PROCEDURE — 71000033 HC RECOVERY, INTIAL HOUR: Performed by: UROLOGY

## 2023-10-09 PROCEDURE — D9220A PRA ANESTHESIA: ICD-10-PCS | Mod: ANES,,, | Performed by: ANESTHESIOLOGY

## 2023-10-09 PROCEDURE — 63600175 PHARM REV CODE 636 W HCPCS: Performed by: NURSE PRACTITIONER

## 2023-10-09 PROCEDURE — 10160 PR PUNCTURE DRAINAGE, LESION: ICD-10-PCS | Mod: 51,,, | Performed by: UROLOGY

## 2023-10-09 PROCEDURE — 25000003 PHARM REV CODE 250: Performed by: HOSPITALIST

## 2023-10-09 PROCEDURE — 25000003 PHARM REV CODE 250: Performed by: ANESTHESIOLOGY

## 2023-10-09 PROCEDURE — 80202 ASSAY OF VANCOMYCIN: CPT | Performed by: HOSPITALIST

## 2023-10-09 PROCEDURE — C1729 CATH, DRAINAGE: HCPCS | Performed by: UROLOGY

## 2023-10-09 PROCEDURE — D9220A PRA ANESTHESIA: Mod: ANES,,, | Performed by: ANESTHESIOLOGY

## 2023-10-09 RX ORDER — OXYCODONE HYDROCHLORIDE 5 MG/1
5 TABLET ORAL
Status: DISCONTINUED | OUTPATIENT
Start: 2023-10-09 | End: 2023-10-11 | Stop reason: HOSPADM

## 2023-10-09 RX ORDER — PROPOFOL 10 MG/ML
VIAL (ML) INTRAVENOUS
Status: DISCONTINUED | OUTPATIENT
Start: 2023-10-09 | End: 2023-10-09

## 2023-10-09 RX ORDER — DEXAMETHASONE SODIUM PHOSPHATE 4 MG/ML
INJECTION, SOLUTION INTRA-ARTICULAR; INTRALESIONAL; INTRAMUSCULAR; INTRAVENOUS; SOFT TISSUE
Status: DISCONTINUED | OUTPATIENT
Start: 2023-10-09 | End: 2023-10-09

## 2023-10-09 RX ORDER — ONDANSETRON 2 MG/ML
INJECTION INTRAMUSCULAR; INTRAVENOUS
Status: DISCONTINUED | OUTPATIENT
Start: 2023-10-09 | End: 2023-10-09

## 2023-10-09 RX ORDER — PHENAZOPYRIDINE HYDROCHLORIDE 100 MG/1
100 TABLET, FILM COATED ORAL ONCE
Status: COMPLETED | OUTPATIENT
Start: 2023-10-09 | End: 2023-10-09

## 2023-10-09 RX ORDER — FENTANYL CITRATE 50 UG/ML
INJECTION, SOLUTION INTRAMUSCULAR; INTRAVENOUS
Status: DISCONTINUED | OUTPATIENT
Start: 2023-10-09 | End: 2023-10-09

## 2023-10-09 RX ORDER — SODIUM CHLORIDE 0.9 % (FLUSH) 0.9 %
3 SYRINGE (ML) INJECTION
Status: DISCONTINUED | OUTPATIENT
Start: 2023-10-09 | End: 2023-10-11 | Stop reason: HOSPADM

## 2023-10-09 RX ORDER — PROCHLORPERAZINE EDISYLATE 5 MG/ML
5 INJECTION INTRAMUSCULAR; INTRAVENOUS EVERY 30 MIN PRN
Status: DISCONTINUED | OUTPATIENT
Start: 2023-10-09 | End: 2023-10-11 | Stop reason: HOSPADM

## 2023-10-09 RX ORDER — MUPIROCIN 20 MG/G
OINTMENT TOPICAL 2 TIMES DAILY
Status: DISCONTINUED | OUTPATIENT
Start: 2023-10-09 | End: 2023-10-11 | Stop reason: HOSPADM

## 2023-10-09 RX ORDER — HYDROMORPHONE HYDROCHLORIDE 2 MG/ML
0.4 INJECTION, SOLUTION INTRAMUSCULAR; INTRAVENOUS; SUBCUTANEOUS EVERY 5 MIN PRN
Status: DISCONTINUED | OUTPATIENT
Start: 2023-10-09 | End: 2023-10-11 | Stop reason: HOSPADM

## 2023-10-09 RX ORDER — MEPERIDINE HYDROCHLORIDE 25 MG/ML
12.5 INJECTION INTRAMUSCULAR; INTRAVENOUS; SUBCUTANEOUS ONCE AS NEEDED
Status: ACTIVE | OUTPATIENT
Start: 2023-10-09 | End: 2023-10-10

## 2023-10-09 RX ORDER — MIDAZOLAM HYDROCHLORIDE 1 MG/ML
INJECTION INTRAMUSCULAR; INTRAVENOUS
Status: DISCONTINUED | OUTPATIENT
Start: 2023-10-09 | End: 2023-10-09

## 2023-10-09 RX ORDER — LIDOCAINE HYDROCHLORIDE 20 MG/ML
INJECTION INTRAVENOUS
Status: DISCONTINUED | OUTPATIENT
Start: 2023-10-09 | End: 2023-10-09

## 2023-10-09 RX ORDER — ALBUTEROL SULFATE 2.5 MG/.5ML
2.5 SOLUTION RESPIRATORY (INHALATION) ONCE
Status: COMPLETED | OUTPATIENT
Start: 2023-10-09 | End: 2023-10-09

## 2023-10-09 RX ADMIN — FENTANYL CITRATE 100 MCG: 50 INJECTION, SOLUTION INTRAMUSCULAR; INTRAVENOUS at 08:10

## 2023-10-09 RX ADMIN — ONDANSETRON HYDROCHLORIDE 4 MG: 2 INJECTION INTRAMUSCULAR; INTRAVENOUS at 09:10

## 2023-10-09 RX ADMIN — MUPIROCIN: 20 OINTMENT TOPICAL at 09:10

## 2023-10-09 RX ADMIN — OXYCODONE HYDROCHLORIDE 5 MG: 5 TABLET ORAL at 12:10

## 2023-10-09 RX ADMIN — ALBUTEROL SULFATE 2.5 MG: 2.5 SOLUTION RESPIRATORY (INHALATION) at 08:10

## 2023-10-09 RX ADMIN — PIPERACILLIN AND TAZOBACTAM 4.5 G: 4; .5 INJECTION, POWDER, LYOPHILIZED, FOR SOLUTION INTRAVENOUS; PARENTERAL at 06:10

## 2023-10-09 RX ADMIN — VANCOMYCIN HYDROCHLORIDE 2000 MG: 500 INJECTION, POWDER, LYOPHILIZED, FOR SOLUTION INTRAVENOUS at 04:10

## 2023-10-09 RX ADMIN — FENTANYL CITRATE 50 MCG: 50 INJECTION, SOLUTION INTRAMUSCULAR; INTRAVENOUS at 08:10

## 2023-10-09 RX ADMIN — PIPERACILLIN AND TAZOBACTAM 4.5 G: 4; .5 INJECTION, POWDER, LYOPHILIZED, FOR SOLUTION INTRAVENOUS; PARENTERAL at 09:10

## 2023-10-09 RX ADMIN — PIPERACILLIN AND TAZOBACTAM 4.5 G: 4; .5 INJECTION, POWDER, LYOPHILIZED, FOR SOLUTION INTRAVENOUS; PARENTERAL at 02:10

## 2023-10-09 RX ADMIN — PROPOFOL 250 MG: 10 INJECTION, EMULSION INTRAVENOUS at 08:10

## 2023-10-09 RX ADMIN — VANCOMYCIN HYDROCHLORIDE 1750 MG: 500 INJECTION, POWDER, LYOPHILIZED, FOR SOLUTION INTRAVENOUS at 06:10

## 2023-10-09 RX ADMIN — LIDOCAINE HYDROCHLORIDE 80 MG: 20 INJECTION, SOLUTION INTRAVENOUS at 08:10

## 2023-10-09 RX ADMIN — DEXAMETHASONE SODIUM PHOSPHATE 8 MG: 4 INJECTION, SOLUTION INTRAMUSCULAR; INTRAVENOUS at 08:10

## 2023-10-09 RX ADMIN — PHENAZOPYRIDINE 100 MG: 100 TABLET ORAL at 07:10

## 2023-10-09 RX ADMIN — GLYCOPYRROLATE 0.2 MG: 0.2 INJECTION, SOLUTION INTRAMUSCULAR; INTRAVITREAL at 08:10

## 2023-10-09 RX ADMIN — SODIUM CHLORIDE, SODIUM LACTATE, POTASSIUM CHLORIDE, AND CALCIUM CHLORIDE: .6; .31; .03; .02 INJECTION, SOLUTION INTRAVENOUS at 08:10

## 2023-10-09 RX ADMIN — SODIUM CHLORIDE: 9 INJECTION, SOLUTION INTRAVENOUS at 01:10

## 2023-10-09 RX ADMIN — MIDAZOLAM HYDROCHLORIDE 2 MG: 1 INJECTION, SOLUTION INTRAMUSCULAR; INTRAVENOUS at 08:10

## 2023-10-09 RX ADMIN — OXYCODONE HYDROCHLORIDE 5 MG: 5 TABLET ORAL at 07:10

## 2023-10-09 NOTE — ANESTHESIA PREPROCEDURE EVALUATION
10/09/2023  Sinan Bear is a 38 y.o., male.      Pre-op Assessment    I have reviewed the Patient Summary Reports.     I have reviewed the Nursing Notes. I have reviewed the NPO Status.   I have reviewed the Medications.     Review of Systems  Anesthesia Hx:  No problems with previous Anesthesia  Denies Family Hx of Anesthesia complications.   Denies Personal Hx of Anesthesia complications.   Social:  Smoker 1 ppd   Cardiovascular:   Exercise tolerance: good    Endocrine:  Obesity / BMI > 30      Physical Exam  General: Well nourished, Cooperative, Oriented and Alert    Airway:  Mouth Opening: Normal  TM Distance: Normal  Neck ROM: Normal ROM    Dental:  Intact        Anesthesia Plan  Type of Anesthesia, risks & benefits discussed:    Anesthesia Type: Gen ETT, Gen Supraglottic Airway  Intra-op Monitoring Plan: Standard ASA Monitors  Post Op Pain Control Plan: multimodal analgesia  Induction:  IV  Airway Plan: Video and Direct  Informed Consent: Informed consent signed with the Patient and all parties understand the risks and agree with anesthesia plan.  All questions answered.   ASA Score: 2  Day of Surgery Review of History & Physical: H&P Update referred to the surgeon/provider.    Ready For Surgery From Anesthesia Perspective.     .

## 2023-10-09 NOTE — PROGRESS NOTES
Pharmacokinetic Assessment Follow Up: IV Vancomycin    Vancomycin serum concentration assessment(s):    The trough level was drawn correctly and can be used to guide therapy at this time. The measurement is below the desired definitive target range of 10 to 20 mcg/mL.    Vancomycin Regimen Plan:    Change regimen to Vancomycin 1750 mg IV every 8 hours with next serum trough concentration measured at 1700 prior to next dose on 10/10/23    Drug levels (last 3 results):  Recent Labs   Lab Result Units 10/09/23  1620   Vancomycin-Trough ug/mL 9.4*       Pharmacy will continue to follow and monitor vancomycin.    Please contact pharmacy at [email or web address] [phone] for questions regarding this assessment.    Thank you for the consult,   Bridget Duarte       Patient brief summary:  Sinan Bear is a 38 y.o. male initiated on antimicrobial therapy with IV Vancomycin for treatment of skin & soft tissue infection    The patient's current regimen is 1750 mg every 8 hours    Drug Allergies:   Review of patient's allergies indicates:  No Known Allergies    Actual Body Weight:   123.2 kg    Renal Function:   Estimated Creatinine Clearance: 124.5 mL/min (based on SCr of 1.1 mg/dL).,     Dialysis Method (if applicable):  N/A    CBC (last 72 hours):  Recent Labs   Lab Result Units 10/07/23  2354 10/08/23  1147 10/09/23  1100   WBC K/uL 8.33 9.37 7.78   Hemoglobin g/dL 15.3 15.8 15.5   Hematocrit % 45.8 48.1 47.0   Platelets K/uL 270 292 289   Gran % % 49.9 41.6 84.8*   Lymph % % 36.9 44.5 12.2*   Mono % % 8.3 9.9 2.3*   Eosinophil % % 4.1 3.4 0.5   Basophil % % 0.4 0.4 0.1   Differential Method  Automated Automated Automated       Metabolic Panel (last 72 hours):  Recent Labs   Lab Result Units 10/07/23  2354 10/08/23  0254 10/08/23  1147 10/09/23  1100   Sodium mmol/L 140  --  140 138   Potassium mmol/L 4.0  --  4.0 4.6   Chloride mmol/L 107  --  107 105   CO2 mmol/L 22*  --  23 24   Glucose mg/dL 103  --  94 108   Glucose, UA    --  Negative  --   --    BUN mg/dL 11  --  7 8   Creatinine mg/dL 1.0  --  1.0 1.1   Albumin g/dL 4.0  --  4.1 3.9   Total Bilirubin mg/dL 0.3  --  0.6 0.4   Alkaline Phosphatase U/L 73  --  71 68   AST U/L 19  --  17 16   ALT U/L 23  --  20 19   Magnesium mg/dL  --   --  2.1 2.2   Phosphorus mg/dL  --   --  3.1 2.1*       Vancomycin Administrations:  vancomycin given in the last 96 hours                     vancomycin 2 g in dextrose 5 % 500 mL IVPB (mg) 2,000 mg New Bag 10/09/23 0418      Restarted 10/08/23 1732     2,000 mg New Bag  1704    vancomycin 2 g in dextrose 5 % 500 mL IVPB (mg) 2,000 mg New Bag 10/08/23 0517                    Microbiologic Results:  Microbiology Results (last 7 days)       Procedure Component Value Units Date/Time    Gram stain [[id number]] Collected: 10/09/23 0920    Order Status: Completed Specimen: Abscess from Perineum Updated: 10/09/23 1453     Gram Stain Result Few WBC's      No organisms seen    Narrative:      Perineal abscess    Culture, Anaerobic [[id number]] Collected: 10/09/23 0920    Order Status: Sent Specimen: Abscess from Perineum Updated: 10/09/23 1118    Aerobic culture [[id number]] Collected: 10/09/23 0920    Order Status: Sent Specimen: Abscess from Perineum Updated: 10/09/23 1118

## 2023-10-09 NOTE — ANESTHESIA PROCEDURE NOTES
Intubation    Date/Time: 10/9/2023 8:15 AM    Performed by: Niecy Friedman CRNA  Authorized by: Miquel Qureshi MD    Intubation:     Induction:  Intravenous    Intubated:  Postinduction    Mask Ventilation:  Easy with oral airway (facial hair)    Attempts:  1    Attempted By:  CRNA    Method of Intubation:  Fast track LMA    Difficult Airway Encountered?: No      Complications:  None    Airway Device:  Supraglottic airway/LMA    Airway Device Size:  5.0    Style/Cuff Inflation:  Cuffed    Secured at:  The lips    Placement Verified By:  Capnometry    Complicating Factors:  None    Findings Post-Intubation:  Atraumatic/condition of teeth unchanged and BS equal bilateral

## 2023-10-09 NOTE — ASSESSMENT & PLAN NOTE
- Patient presented with one day of pain between the penis and rectum.  Patient with no prior medical history although noted to be a daily smoker, takes no medications.  - No leukocytosis, UA normal, low grade temp noted.  - CT pelvis showed a fluid collection that was peripherally enhancing at the proximal inferior base of the penis consistent with abscess.  - Urology consulted for evaluation - collection was immediately adjacent to the urethra so there was a risk of urethral cutaneous fistula developing with surgery.  Recommended monitoring on IV antibiotics.  - This AM there is no improvement.  Fluid collection unlikely to resolve with antibiotics alone - patient scheduled for surgery today (10/9).  - 10/9 surgery - cystoscopy with urethral dilation, aspiration of perineal abscess, placement of Dennis catheter  - I&D was considered but did not seem necessary.  Urologist felt this may have been a periurethral abscess associated with the stricture, and it may have drained via the urethra following the dilation.  - Continue empiric antibiotics while awaiting C&S

## 2023-10-09 NOTE — ANESTHESIA POSTPROCEDURE EVALUATION
Anesthesia Post Evaluation    Patient: Sinan Bear    Procedure(s) Performed: Procedure(s) (LRB):  percutaneous drainage perineal abscess (N/A)  CYSTOSCOPY, WITH URETHRAL DILATION    Final Anesthesia Type: general      Patient location during evaluation: PACU  Patient participation: Yes- Able to Participate  Level of consciousness: awake and alert  Post-procedure vital signs: reviewed and stable  Pain management: adequate  Airway patency: patent    PONV status at discharge: No PONV  Anesthetic complications: no      Cardiovascular status: blood pressure returned to baseline  Respiratory status: unassisted and spontaneous ventilation  Hydration status: euvolemic  Follow-up not needed.          Vitals Value Taken Time   /73 10/09/23 1002   Temp 36.5 °C (97.7 °F) 10/09/23 0915   Pulse 69 10/09/23 1003   Resp 18 10/09/23 1000   SpO2 97 % 10/09/23 1003   Vitals shown include unvalidated device data.      No case tracking events are documented in the log.      Pain/Juwan Score: Pain Rating Prior to Med Admin: 9 (10/8/2023 10:29 PM)  Pain Rating Post Med Admin: 2 (10/8/2023 10:59 PM)  Juwan Score: 10 (10/9/2023  9:45 AM)

## 2023-10-09 NOTE — SUBJECTIVE & OBJECTIVE
Interval History:  Patient seen after surgery.  He was alarmed by the Dennis catheter being in place.  Discussed reason for Dennis with him.  He does not have any pain currently but is worried about the severe pain he had yesterday returning, would like to take the pain medication before pain develops.  Wife at bedside.    Review of Systems   Constitutional:  Negative for chills and fever.   Respiratory:  Negative for cough and shortness of breath.    Cardiovascular:  Negative for chest pain and palpitations.     Objective:     Vital Signs (Most Recent):  Temp: 97.9 °F (36.6 °C) (10/09/23 1043)  Pulse: 64 (10/09/23 1043)  Resp: 18 (10/09/23 1043)  BP: 122/77 (10/09/23 1043)  SpO2: 97 % (10/09/23 1043) Vital Signs (24h Range):  Temp:  [97.7 °F (36.5 °C)-98.4 °F (36.9 °C)] 97.9 °F (36.6 °C)  Pulse:  [64-88] 64  Resp:  [16-20] 18  SpO2:  [95 %-100 %] 97 %  BP: (122-146)/(73-86) 122/77     Weight: 123.2 kg (271 lb 9.7 oz)  Body mass index is 34.87 kg/m².    Intake/Output Summary (Last 24 hours) at 10/9/2023 1238  Last data filed at 10/9/2023 0956  Gross per 24 hour   Intake 3210.97 ml   Output 1025 ml   Net 2185.97 ml         Physical Exam  Cardiovascular:      Rate and Rhythm: Normal rate and regular rhythm.      Pulses: Normal pulses.      Heart sounds: Normal heart sounds. No murmur heard.     No gallop.   Pulmonary:      Effort: Pulmonary effort is normal.      Breath sounds: Normal breath sounds.   Abdominal:      General: Bowel sounds are normal.      Palpations: Abdomen is soft.   Skin:     General: Skin is warm and dry.   Neurological:      General: No focal deficit present.      Mental Status: He is alert and oriented to person, place, and time.             Significant Labs: All pertinent labs within the past 24 hours have been reviewed.    Significant Imaging: I have reviewed all pertinent imaging results/findings within the past 24 hours.

## 2023-10-09 NOTE — TRANSFER OF CARE
"Anesthesia Transfer of Care Note    Patient: Sinan Bear    Procedure(s) Performed: Procedure(s) (LRB):  percutaneous drainage perineal abscess (N/A)  CYSTOSCOPY, WITH URETHRAL DILATION    Patient location: PACU    Anesthesia Type: general    Transport from OR: Transported from OR on 6-10 L/min O2 by face mask with adequate spontaneous ventilation    Post pain: adequate analgesia    Post assessment: no apparent anesthetic complications and tolerated procedure well    Post vital signs: stable    Level of consciousness: awake    Nausea/Vomiting: no nausea/vomiting    Complications: none    Transfer of care protocol was followed      Last vitals:   Visit Vitals  BP (!) 141/86 (BP Location: Left arm, Patient Position: Lying)   Pulse 72   Temp 36.7 °C (98.1 °F) (Oral)   Resp 20   Ht 6' 2" (1.88 m)   Wt 123.2 kg (271 lb 9.7 oz)   SpO2 99%   BMI 34.87 kg/m²     "

## 2023-10-09 NOTE — ASSESSMENT & PLAN NOTE
- Fluid collection noted in perineum on CT. Collection appears immediately adjacent to urethra and appears deep to skin level. Induration is palpable on exam. No s/s Carmella's.   - Discussed concern for I&D of perineum as risk of urethral cutaneous fistula due to proximity of the urethra. However, given the fluid collection on CT scan this is unlikely to resolve with antibitiocs alone.    - Continue IV abx   - NPO   - Plan for I&D of perineal abscess today in the OR. Possible cystoscopy with Dennis placement

## 2023-10-09 NOTE — PROGRESS NOTES
"[facility] ([address])  Urology  Progress Note    Patient Name: Sinan Bear  MRN: [id number]  Admission Date: 10/7/2023  Hospital Length of Stay: 1 days  Code Status: Full Code   Attending Provider: Lucille Caicedo MD   Primary Care Physician: Raisa, Primary Doctor    Subjective:     HPI:  39yo M with several day history of perineal pain. He was seen at outside ER 2 days ago and diagnosed with prostatitis and given Cipro. Returns with worsening pain. No fevers. CT showed fluid collection deep in perineum adjacent to the bulbar urethra. Denies penile discharge. Denies dysuria, hematuria. Slow urinary stream occurred years ago, no change in stream recently. Denies fever. No h/o STIs. He is not diabetic. He does have h/o recurrent "boils" in inguinal/buttock area that have been I&Ded in the past. He was seen by  NP in 2019 with perineal pain - treated for prostatitis.     UA negative. WBC normal. Cr 1.0.          Interval History: NAEON. AFVSS. Patient with continued pain, controlled by IV morphine. Voiding without difficulty.       Objective:     Temp:  [97.7 °F (36.5 °C)-98.4 °F (36.9 °C)] 98.3 °F (36.8 °C)  Pulse:  [74-79] 76  Resp:  [16-20] 20  SpO2:  [95 %-99 %] 97 %  BP: (124-142)/(79-84) 124/83     Body mass index is 34.87 kg/m².           Drains       None                    Physical Exam  Vitals reviewed.   Constitutional:       General: He is not in acute distress.     Appearance: He is not diaphoretic.   HENT:      Head: Normocephalic and atraumatic.      Nose: Nose normal.   Eyes:      Conjunctiva/sclera: Conjunctivae normal.   Cardiovascular:      Rate and Rhythm: Normal rate.   Pulmonary:      Effort: Pulmonary effort is normal. No respiratory distress.   Abdominal:      General: There is no distension.   Genitourinary:     Comments: Tenderness to palpation in the perineum. Area of induration noted in the perineum. No crepitus or fluctuance appreciated. No penile drainage. No scrotal " involvement.   Musculoskeletal:         General: Normal range of motion.      Cervical back: Normal range of motion.   Skin:     General: Skin is dry.   Neurological:      Mental Status: He is alert.   Psychiatric:         Behavior: Behavior normal.         Thought Content: Thought content normal.           Significant Labs:    BMP:  Recent Labs   Lab 10/07/23  2354 10/08/23  1147    140   K 4.0 4.0    107   CO2 22* 23   BUN 11 7   CREATININE 1.0 1.0   CALCIUM 9.5 9.3       CBC:   Recent Labs   Lab 10/07/23  2354 10/08/23  1147   WBC 8.33 9.37   HGB 15.3 15.8   HCT 45.8 48.1    292       All pertinent labs results from the past 24 hours have been reviewed.    Significant Imaging:  All pertinent imaging results/findings from the past 24 hours have been reviewed.                    Assessment/Plan:     * Perineal abscess   - Fluid collection noted in perineum on CT. Collection appears immediately adjacent to urethra and appears deep to skin level. Induration is palpable on exam. No s/s Carmella's.   - Discussed concern for I&D of perineum as risk of urethral cutaneous fistula due to proximity of the urethra. However, given the fluid collection on CT scan this is unlikely to resolve with antibitiocs alone.    - Continue IV abx   - NPO   - Plan for I&D of perineal abscess today in the OR. Possible cystoscopy with Dennis placement        VTE Risk Mitigation (From admission, onward)         Ordered     IP VTE HIGH RISK PATIENT  Once         10/08/23 0349     Place sequential compression device  Until discontinued         10/08/23 0349     Reason for No Pharmacological VTE Prophylaxis  Once        Question:  Reasons:  Answer:  Risk of Bleeding    10/08/23 0349                Catina Miles MD  Urology  [facility] ([address])

## 2023-10-09 NOTE — OP NOTE
[facility]  Operative Note    Date: 10/09/2023    Pre-Op Diagnosis: abscess of perineum  Patient Active Problem List    Diagnosis Date Noted    Perineal abscess 10/08/2023    Nicotine dependence, cigarettes, uncomplicated 10/08/2023      Post-Op Diagnosis: same + urethral stricture    Procedure(s) Performed:   1. Cystoscopy with urethral dilation  2. Placement of Dennis catheter over a wire  3. Percutaneous aspiration of perineum abscess    Specimen(s): perineum abscess for culture    Staff Surgeon: William Shelley MD    Assistant Surgeon: Catina Miles MD     Anesthesia: General endotracheal anesthesia    Indications: Sinan Bear is a 38 y.o. male with perineum abscess. He presents today for I&D with possible cystoscopy.    Findings:   - Resistance met with attempt at Dennis catheter placement.   - Flexible cystoscopy revealed urethral stricture in the bulbar urethra, this was dilated from 12Fr-20Fr using Perfecto dilators.   - 16Fr [place] tipped Dennis catheter placed over a wire with return of light pink urine.  - Due to concern for urethrocutaneous fistula, decision made to perform percutaneous drainage of the perineum abscess with return of ~2cc of purulent drainage.    Estimated Blood Loss: min    Drains: 16Fr [place] tipped Dennis catheter with 10cc in balloon.    Procedure in Detail:  After risks, benefits and possible complications of cystoscopy were explained, the patient elected to undergo the procedure and informed consent was obtained. All questions were answered in the bret-operative area. The patient was transferred to the cystoscopy suite and placed in the supine position.  SCDs were applied and working.  Anesthesia was administered.  Once the patient was adequately sedated, he was placed in the dorsal lithotomy position and prepped and draped in the usual sterile fashion.  Time out was performed, bret-procedural antibiotics were confirmed.     Resistance met with attempt at  Schuster catheter placement. The flexible cystoscope was advanced into the urethra and a urethral stricture was visualized in the bulbar urethra, with a small false passage posterior to this. A wire was passed through the scope, through the urethral lumen and into the patient's bladder. Serial dilations were performed with Perfecto dilators from 12Fr to 20Fr. The flexible cystoscope was introduced into the bladder per urethra which confirmed appropriate placement. The cystoscope was removed. A 16 Fr [place] schuster was inserted over the wire into the bladder.  Light pink colored urine was returned. The schuster was attached to a  bag and secured to the patient's leg.     Due to the concern for development of urethrocutaneous fistula, decision made to perform percutaneous drainage of the perineum abscess. This was done with a 14 gauge angiocath with return of ~2cc of purulent drainage. This was sent off for culture.    The patient tolerated the procedure well and was transferred to recovery in stable condition.    Disposition:  The patient will be transferred back to the floor for continued monitoring. Maintain Schuster for at least one week.     Catina Miles MD

## 2023-10-09 NOTE — SUBJECTIVE & OBJECTIVE
Interval History: NAEON. AFVSS. Patient with continued pain, controlled by IV morphine. Voiding without difficulty.       Objective:     Temp:  [97.7 °F (36.5 °C)-98.4 °F (36.9 °C)] 98.3 °F (36.8 °C)  Pulse:  [74-79] 76  Resp:  [16-20] 20  SpO2:  [95 %-99 %] 97 %  BP: (124-142)/(79-84) 124/83     Body mass index is 34.87 kg/m².           Drains       None                    Physical Exam  Vitals reviewed.   Constitutional:       General: He is not in acute distress.     Appearance: He is not diaphoretic.   HENT:      Head: Normocephalic and atraumatic.      Nose: Nose normal.   Eyes:      Conjunctiva/sclera: Conjunctivae normal.   Cardiovascular:      Rate and Rhythm: Normal rate.   Pulmonary:      Effort: Pulmonary effort is normal. No respiratory distress.   Abdominal:      General: There is no distension.   Genitourinary:     Comments: Tenderness to palpation in the perineum. Area of induration noted in the perineum. No crepitus or fluctuance appreciated. No penile drainage. No scrotal involvement.   Musculoskeletal:         General: Normal range of motion.      Cervical back: Normal range of motion.   Skin:     General: Skin is dry.   Neurological:      Mental Status: He is alert.   Psychiatric:         Behavior: Behavior normal.         Thought Content: Thought content normal.           Significant Labs:    BMP:  Recent Labs   Lab 10/07/23  2354 10/08/23  1147    140   K 4.0 4.0    107   CO2 22* 23   BUN 11 7   CREATININE 1.0 1.0   CALCIUM 9.5 9.3       CBC:   Recent Labs   Lab 10/07/23  2354 10/08/23  1147   WBC 8.33 9.37   HGB 15.3 15.8   HCT 45.8 48.1    292       All pertinent labs results from the past 24 hours have been reviewed.    Significant Imaging:  All pertinent imaging results/findings from the past 24 hours have been reviewed.

## 2023-10-09 NOTE — OR NURSING
VSS on RA. Denies pain. PIV CDI. Dennis intact draining. Meets PACU discharge criteria. Ready for transfer to [address] [place] [address]. Report given to JOSE Lee.

## 2023-10-09 NOTE — PROGRESS NOTES
"[address] Medicine  Progress Note    Patient Name: Sinan Bear  MRN: [id number]  Patient Class: IP- Inpatient   Admission Date: 10/7/2023  Length of Stay: 1 days  Attending Physician: Lucille Caicedo MD  Primary Care Provider: Raisa, Primary Doctor        Subjective:     Principal Problem:Perineal abscess        HPI:  From H&P by Joon Cintron NP:  "The patient is a 38 year old male with no significant past medical history who presents with a painful area between his penis and rectum.  He states the pain started in the last 24 hours with acute worsening over the last 3-4 hours.  CT done showing abscess at the base of the penis.  He will be admitted for further management of his abscess and Urology consult."      Overview/Hospital Course:  Patient made NPO and admitted on empiric IV antibiotics (vancomycin and Zosyn) and Urology was consulted for evaluation.  He was watched for a day on IV antibiotics but as there was no clear improvement they qplanned to take him to the OR on 10/9.  Collection was immediately adjacent to the urethra so there was a concern for I&D of the perineum given risk of urethral cutaneous fistula developing due to the urethral proximity.    Patient underwent cystoscopy on 10/9 with Dr. Shelley.  Urethral stricture was seen requiring dilation.  The perineal abscess was aspirated and about 2 cc purulent drainage sent for culture.  Possibly this was a periurethral abscess associated with the stricture.  Dennis catheter was placed following the procedure.  Empiric antibiotics were continued while awaiting culture results.      Interval History:  Patient seen after surgery.  He was alarmed by the Dennis catheter being in place.  Discussed reason for Dennis with him.  He does not have any pain currently but is worried about the severe pain he had yesterday returning, would like to take the pain medication before pain develops.  Wife at bedside.    Review of " Systems   Constitutional:  Negative for chills and fever.   Respiratory:  Negative for cough and shortness of breath.    Cardiovascular:  Negative for chest pain and palpitations.     Objective:     Vital Signs (Most Recent):  Temp: 97.9 °F (36.6 °C) (10/09/23 1043)  Pulse: 64 (10/09/23 1043)  Resp: 18 (10/09/23 1043)  BP: 122/77 (10/09/23 1043)  SpO2: 97 % (10/09/23 1043) Vital Signs (24h Range):  Temp:  [97.7 °F (36.5 °C)-98.4 °F (36.9 °C)] 97.9 °F (36.6 °C)  Pulse:  [64-88] 64  Resp:  [16-20] 18  SpO2:  [95 %-100 %] 97 %  BP: (122-146)/(73-86) 122/77     Weight: 123.2 kg (271 lb 9.7 oz)  Body mass index is 34.87 kg/m².    Intake/Output Summary (Last 24 hours) at 10/9/2023 1238  Last data filed at 10/9/2023 0956  Gross per 24 hour   Intake 3210.97 ml   Output 1025 ml   Net 2185.97 ml         Physical Exam  Cardiovascular:      Rate and Rhythm: Normal rate and regular rhythm.      Pulses: Normal pulses.      Heart sounds: Normal heart sounds. No murmur heard.     No gallop.   Pulmonary:      Effort: Pulmonary effort is normal.      Breath sounds: Normal breath sounds.   Abdominal:      General: Bowel sounds are normal.      Palpations: Abdomen is soft.   Skin:     General: Skin is warm and dry.   Neurological:      General: No focal deficit present.      Mental Status: He is alert and oriented to person, place, and time.             Significant Labs: All pertinent labs within the past 24 hours have been reviewed.    Significant Imaging: I have reviewed all pertinent imaging results/findings within the past 24 hours.      Assessment/Plan:      * Perineal abscess  - Patient presented with one day of pain between the penis and rectum.  Patient with no prior medical history although noted to be a daily smoker, takes no medications.  - No leukocytosis, UA normal, low grade temp noted.  - CT pelvis showed a fluid collection that was peripherally enhancing at the proximal inferior base of the penis consistent with  "abscess.  - Urology consulted for evaluation - collection was immediately adjacent to the urethra so there was a risk of urethral cutaneous fistula developing with surgery.  Recommended monitoring on IV antibiotics.  - This AM there is no improvement.  Fluid collection unlikely to resolve with antibiotics alone - patient scheduled for surgery today (10/9).  - 10/9 surgery - cystoscopy with urethral dilation, aspiration of perineal abscess, placement of Dennis catheter  - I&D was considered but did not seem necessary.  Urologist felt this may have been a periurethral abscess associated with the stricture, and it may have drained via the urethra following the dilation.  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  See "perineal abscess"      Nicotine dependence, cigarettes, uncomplicated  Nicotine patch ordered prn patient request - currently does not want it      VTE Risk Mitigation (From admission, onward)         Ordered     IP VTE HIGH RISK PATIENT  Once         10/08/23 0349     Place sequential compression device  Until discontinued         10/08/23 0349     Reason for No Pharmacological VTE Prophylaxis  Once        Question:  Reasons:  Answer:  Risk of Bleeding    10/08/23 0349                Discharge Planning   KATARZYNA:      Code Status: Full Code   Is the patient medically ready for discharge?:     Reason for patient still in hospital (select all that apply): Patient trending condition, Laboratory test, Treatment and Consult recommendations  Discharge Plan A: Home with family                  Lucille Low MD  Department of Hospital Medicine   [facility] Surg ([address])  "

## 2023-10-10 LAB
ALBUMIN SERPL BCP-MCNC: 3.8 G/DL (ref 3.5–5.2)
ALP SERPL-CCNC: 66 U/L (ref 55–135)
ALT SERPL W/O P-5'-P-CCNC: 19 U/L (ref 10–44)
ANION GAP SERPL CALC-SCNC: 7 MMOL/L (ref 8–16)
AST SERPL-CCNC: 15 U/L (ref 10–40)
BASOPHILS # BLD AUTO: 0.02 K/UL (ref 0–0.2)
BASOPHILS NFR BLD: 0.2 % (ref 0–1.9)
BILIRUB SERPL-MCNC: 0.4 MG/DL (ref 0.1–1)
BUN SERPL-MCNC: 10 MG/DL (ref 6–20)
CALCIUM SERPL-MCNC: 9.8 MG/DL (ref 8.7–10.5)
CHLORIDE SERPL-SCNC: 104 MMOL/L (ref 95–110)
CO2 SERPL-SCNC: 25 MMOL/L (ref 23–29)
CREAT SERPL-MCNC: 1 MG/DL (ref 0.5–1.4)
DIFFERENTIAL METHOD: ABNORMAL
EOSINOPHIL # BLD AUTO: 0 K/UL (ref 0–0.5)
EOSINOPHIL NFR BLD: 0.3 % (ref 0–8)
ERYTHROCYTE [DISTWIDTH] IN BLOOD BY AUTOMATED COUNT: 12.5 % (ref 11.5–14.5)
EST. GFR  (NO RACE VARIABLE): >60 ML/MIN/1.73 M^2
GLUCOSE SERPL-MCNC: 162 MG/DL (ref 70–110)
HCT VFR BLD AUTO: 46.7 % (ref 40–54)
HGB BLD-MCNC: 15.3 G/DL (ref 14–18)
IMM GRANULOCYTES # BLD AUTO: 0.05 K/UL (ref 0–0.04)
IMM GRANULOCYTES NFR BLD AUTO: 0.4 % (ref 0–0.5)
LYMPHOCYTES # BLD AUTO: 2.2 K/UL (ref 1–4.8)
LYMPHOCYTES NFR BLD: 18.4 % (ref 18–48)
MAGNESIUM SERPL-MCNC: 2.2 MG/DL (ref 1.6–2.6)
MCH RBC QN AUTO: 30.2 PG (ref 27–31)
MCHC RBC AUTO-ENTMCNC: 32.8 G/DL (ref 32–36)
MCV RBC AUTO: 92 FL (ref 82–98)
MONOCYTES # BLD AUTO: 0.7 K/UL (ref 0.3–1)
MONOCYTES NFR BLD: 5.6 % (ref 4–15)
NEUTROPHILS # BLD AUTO: 8.9 K/UL (ref 1.8–7.7)
NEUTROPHILS NFR BLD: 75.1 % (ref 38–73)
NRBC BLD-RTO: 0 /100 WBC
PHOSPHATE SERPL-MCNC: 3 MG/DL (ref 2.7–4.5)
PLATELET # BLD AUTO: 317 K/UL (ref 150–450)
PMV BLD AUTO: 8.7 FL (ref 9.2–12.9)
POTASSIUM SERPL-SCNC: 4 MMOL/L (ref 3.5–5.1)
PROT SERPL-MCNC: 8.1 G/DL (ref 6–8.4)
RBC # BLD AUTO: 5.06 M/UL (ref 4.6–6.2)
SODIUM SERPL-SCNC: 136 MMOL/L (ref 136–145)
VANCOMYCIN TROUGH SERPL-MCNC: <1.1 UG/ML (ref 10–22)
WBC # BLD AUTO: 11.84 K/UL (ref 3.9–12.7)

## 2023-10-10 PROCEDURE — 25000003 PHARM REV CODE 250: Performed by: NURSE PRACTITIONER

## 2023-10-10 PROCEDURE — 94761 N-INVAS EAR/PLS OXIMETRY MLT: CPT

## 2023-10-10 PROCEDURE — 36415 COLL VENOUS BLD VENIPUNCTURE: CPT | Performed by: HOSPITALIST

## 2023-10-10 PROCEDURE — 80202 ASSAY OF VANCOMYCIN: CPT | Performed by: HOSPITALIST

## 2023-10-10 PROCEDURE — 63600175 PHARM REV CODE 636 W HCPCS: Performed by: NURSE PRACTITIONER

## 2023-10-10 PROCEDURE — 63600175 PHARM REV CODE 636 W HCPCS: Performed by: HOSPITALIST

## 2023-10-10 PROCEDURE — 36415 COLL VENOUS BLD VENIPUNCTURE: CPT | Performed by: NURSE PRACTITIONER

## 2023-10-10 PROCEDURE — 85025 COMPLETE CBC W/AUTO DIFF WBC: CPT | Performed by: NURSE PRACTITIONER

## 2023-10-10 PROCEDURE — 25000003 PHARM REV CODE 250: Performed by: ANESTHESIOLOGY

## 2023-10-10 PROCEDURE — 84100 ASSAY OF PHOSPHORUS: CPT | Performed by: NURSE PRACTITIONER

## 2023-10-10 PROCEDURE — 99233 PR SUBSEQUENT HOSPITAL CARE,LEVL III: ICD-10-PCS | Mod: ,,, | Performed by: STUDENT IN AN ORGANIZED HEALTH CARE EDUCATION/TRAINING PROGRAM

## 2023-10-10 PROCEDURE — 99233 SBSQ HOSP IP/OBS HIGH 50: CPT | Mod: ,,, | Performed by: STUDENT IN AN ORGANIZED HEALTH CARE EDUCATION/TRAINING PROGRAM

## 2023-10-10 PROCEDURE — 25000003 PHARM REV CODE 250: Performed by: HOSPITALIST

## 2023-10-10 PROCEDURE — 83735 ASSAY OF MAGNESIUM: CPT | Performed by: NURSE PRACTITIONER

## 2023-10-10 PROCEDURE — 80053 COMPREHEN METABOLIC PANEL: CPT | Performed by: NURSE PRACTITIONER

## 2023-10-10 PROCEDURE — 11000001 HC ACUTE MED/SURG PRIVATE ROOM

## 2023-10-10 PROCEDURE — 25000003 PHARM REV CODE 250: Performed by: STUDENT IN AN ORGANIZED HEALTH CARE EDUCATION/TRAINING PROGRAM

## 2023-10-10 RX ORDER — OXYBUTYNIN CHLORIDE 5 MG/1
5 TABLET ORAL 3 TIMES DAILY PRN
Status: DISCONTINUED | OUTPATIENT
Start: 2023-10-10 | End: 2023-10-11 | Stop reason: HOSPADM

## 2023-10-10 RX ADMIN — OXYBUTYNIN CHLORIDE 5 MG: 5 TABLET ORAL at 10:10

## 2023-10-10 RX ADMIN — VANCOMYCIN HYDROCHLORIDE 1750 MG: 500 INJECTION, POWDER, LYOPHILIZED, FOR SOLUTION INTRAVENOUS at 03:10

## 2023-10-10 RX ADMIN — PIPERACILLIN AND TAZOBACTAM 4.5 G: 4; .5 INJECTION, POWDER, LYOPHILIZED, FOR SOLUTION INTRAVENOUS; PARENTERAL at 02:10

## 2023-10-10 RX ADMIN — VANCOMYCIN HYDROCHLORIDE 1750 MG: 500 INJECTION, POWDER, LYOPHILIZED, FOR SOLUTION INTRAVENOUS at 11:10

## 2023-10-10 RX ADMIN — OXYCODONE HYDROCHLORIDE 5 MG: 5 TABLET ORAL at 02:10

## 2023-10-10 RX ADMIN — OXYCODONE HYDROCHLORIDE 5 MG: 5 TABLET ORAL at 08:10

## 2023-10-10 RX ADMIN — OXYBUTYNIN CHLORIDE 5 MG: 5 TABLET ORAL at 08:10

## 2023-10-10 RX ADMIN — MUPIROCIN: 20 OINTMENT TOPICAL at 08:10

## 2023-10-10 RX ADMIN — PIPERACILLIN AND TAZOBACTAM 4.5 G: 4; .5 INJECTION, POWDER, LYOPHILIZED, FOR SOLUTION INTRAVENOUS; PARENTERAL at 10:10

## 2023-10-10 RX ADMIN — PIPERACILLIN AND TAZOBACTAM 4.5 G: 4; .5 INJECTION, POWDER, LYOPHILIZED, FOR SOLUTION INTRAVENOUS; PARENTERAL at 06:10

## 2023-10-10 RX ADMIN — VANCOMYCIN HYDROCHLORIDE 1750 MG: 500 INJECTION, POWDER, LYOPHILIZED, FOR SOLUTION INTRAVENOUS at 07:10

## 2023-10-10 NOTE — SUBJECTIVE & OBJECTIVE
Interval History: NAEON. AFVSS. Pain much improved. Patient complaining of bladder spasms overnight. Good UOP.      Objective:     Temp:  [97.7 °F (36.5 °C)-99 °F (37.2 °C)] 98.5 °F (36.9 °C)  Pulse:  [64-91] 84  Resp:  [16-20] 18  SpO2:  [95 %-100 %] 97 %  BP: (122-177)/(73-96) 151/86     Body mass index is 34.87 kg/m².           Drains       None                    Physical Exam  Vitals reviewed.   Constitutional:       General: He is not in acute distress.     Appearance: He is not diaphoretic.   HENT:      Head: Normocephalic and atraumatic.      Nose: Nose normal.   Eyes:      Conjunctiva/sclera: Conjunctivae normal.   Cardiovascular:      Rate and Rhythm: Normal rate.   Pulmonary:      Effort: Pulmonary effort is normal. No respiratory distress.   Abdominal:      General: There is no distension.   Genitourinary:     Comments: Dennis in place draining clear yellow urine. Perineum only mildly tender to palpation, much improved. Area of induration much improved. No crepitus, fluctuance, or erythema.   Musculoskeletal:         General: Normal range of motion.      Cervical back: Normal range of motion.   Skin:     General: Skin is dry.   Neurological:      Mental Status: He is alert.   Psychiatric:         Behavior: Behavior normal.         Thought Content: Thought content normal.           Significant Labs:    BMP:  Recent Labs   Lab 10/08/23  1147 10/09/23  1100 10/10/23  0503    138 136   K 4.0 4.6 4.0    105 104   CO2 23 24 25   BUN 7 8 10   CREATININE 1.0 1.1 1.0   CALCIUM 9.3 9.6 9.8         CBC:   Recent Labs   Lab 10/08/23  1147 10/09/23  1100 10/10/23  0503   WBC 9.37 7.78 11.84   HGB 15.8 15.5 15.3   HCT 48.1 47.0 46.7    289 317         All pertinent labs results from the past 24 hours have been reviewed.    Significant Imaging:  All pertinent imaging results/findings from the past 24 hours have been reviewed.

## 2023-10-10 NOTE — PROGRESS NOTES
"[address] Medicine  Progress Note    Patient Name: Sinan Bear  MRN: [id number]  Patient Class: IP- Inpatient   Admission Date: 10/7/2023  Length of Stay: 2 days  Attending Physician: Ewelina Gillis MD  Primary Care Provider: Raisa, Primary Doctor        Subjective:     Principal Problem:Perineal abscess        HPI:  From H&P by Joon Cintron NP:  "The patient is a 38 year old male with no significant past medical history who presents with a painful area between his penis and rectum.  He states the pain started in the last 24 hours with acute worsening over the last 3-4 hours.  CT done showing abscess at the base of the penis.  He will be admitted for further management of his abscess and Urology consult."      Overview/Hospital Course:  Patient made NPO and admitted on empiric IV antibiotics (vancomycin and Zosyn) and Urology was consulted for evaluation.  He was watched for a day on IV antibiotics but as there was no clear improvement they qplanned to take him to the OR on 10/9.  Collection was immediately adjacent to the urethra so there was a concern for I&D of the perineum given risk of urethral cutaneous fistula developing due to the urethral proximity.    Patient underwent cystoscopy on 10/9 with Dr. Shelley.  Urethral stricture was seen requiring dilation.  The perineal abscess was aspirated and about 2 cc purulent drainage sent for culture.  Possibly this was a periurethral abscess associated with the stricture.  Dennis catheter was placed following the procedure.  Empiric antibiotics were continued while awaiting culture results.      Interval History: Pt doing well this AM. Dennis in place. Cont broad spectrum IV abx while awaiting cultures and sensitivities.     Review of Systems   Constitutional:  Negative for chills and fever.   Respiratory:  Negative for cough and shortness of breath.    Cardiovascular:  Negative for chest pain and palpitations.   Genitourinary:  " Positive for difficulty urinating and testicular pain.     Objective:     Vital Signs (Most Recent):  Temp: 98.9 °F (37.2 °C) (10/10/23 0900)  Pulse: 82 (10/10/23 0900)  Resp: 18 (10/10/23 0900)  BP: (!) 145/98 (10/10/23 0900)  SpO2: 100 % (10/10/23 0900) Vital Signs (24h Range):  Temp:  [97.8 °F (36.6 °C)-99 °F (37.2 °C)] 98.9 °F (37.2 °C)  Pulse:  [64-91] 82  Resp:  [18] 18  SpO2:  [95 %-100 %] 100 %  BP: (122-177)/(77-98) 145/98     Weight: 123.2 kg (271 lb 9.7 oz)  Body mass index is 34.87 kg/m².    Intake/Output Summary (Last 24 hours) at 10/10/2023 1010  Last data filed at 10/10/2023 0627  Gross per 24 hour   Intake 720 ml   Output 2000 ml   Net -1280 ml         Physical Exam  Constitutional:       General: He is not in acute distress.  Pulmonary:      Effort: No respiratory distress.      Breath sounds: No wheezing.   Abdominal:      General: There is no distension.      Tenderness: There is no abdominal tenderness.   Genitourinary:     Comments: Dennis in place. No testicular pain   Neurological:      General: No focal deficit present.      Mental Status: He is oriented to person, place, and time.             Significant Labs: All pertinent labs within the past 24 hours have been reviewed.    Significant Imaging: I have reviewed all pertinent imaging results/findings within the past 24 hours.      Assessment/Plan:      * Perineal abscess  - Patient presented with one day of pain between the penis and rectum.  Patient with no prior medical history although noted to be a daily smoker, takes no medications.  - No leukocytosis, UA normal, low grade temp noted.  - CT pelvis showed a fluid collection that was peripherally enhancing at the proximal inferior base of the penis consistent with abscess.  - Urology consulted for evaluation - collection was immediately adjacent to the urethra so there was a risk of urethral cutaneous fistula developing with surgery.  Recommended monitoring on IV antibiotics.  - There was  "no improvement.  Fluid collection unlikely to resolve with antibiotics alone - patient scheduled for surgery today (10/9).  - 10/9 surgery - cystoscopy with urethral dilation, aspiration of perineal abscess, placement of Dennis catheter  - ID was considered but did not seem necessary.  Urologist felt this may have been a periurethral abscess associated with the stricture, and it may have drained via the urethra following the dilation.  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  See "perineal abscess"      Nicotine dependence, cigarettes, uncomplicated  Nicotine patch ordered prn patient request - currently does not want it      VTE Risk Mitigation (From admission, onward)           Ordered     IP VTE HIGH RISK PATIENT  Once         10/08/23 0349     Place sequential compression device  Until discontinued         10/08/23 0349     Reason for No Pharmacological VTE Prophylaxis  Once        Question:  Reasons:  Answer:  Risk of Bleeding    10/08/23 0349                    Discharge Planning   KATARZYNA:      Code Status: Full Code   Is the patient medically ready for discharge?:     Reason for patient still in hospital (select all that apply): Treatment  Discharge Plan A: Home with family                  Ewelina Saucedo MD  Department of Hospital Medicine   [facility] Surg ([address])  "

## 2023-10-10 NOTE — NURSING
Patient stated schuster wasn't draining, abdomen soft nontender, urine noted in schuster tubing. Urimeter emptied, reassessed in 45min 60ml of urine noted, no leakage around insertion site.

## 2023-10-10 NOTE — ASSESSMENT & PLAN NOTE
- Patient presented with one day of pain between the penis and rectum.  Patient with no prior medical history although noted to be a daily smoker, takes no medications.  - No leukocytosis, UA normal, low grade temp noted.  - CT pelvis showed a fluid collection that was peripherally enhancing at the proximal inferior base of the penis consistent with abscess.  - Urology consulted for evaluation - collection was immediately adjacent to the urethra so there was a risk of urethral cutaneous fistula developing with surgery.  Recommended monitoring on IV antibiotics.  - There was no improvement.  Fluid collection unlikely to resolve with antibiotics alone - patient scheduled for surgery today (10/9).  - 10/9 surgery - cystoscopy with urethral dilation, aspiration of perineal abscess, placement of Dennis catheter  - I&D was considered but did not seem necessary.  Urologist felt this may have been a periurethral abscess associated with the stricture, and it may have drained via the urethra following the dilation.  - Continue empiric antibiotics while awaiting C&S

## 2023-10-10 NOTE — SUBJECTIVE & OBJECTIVE
Interval History: Pt doing well this AM. Dennis in place. Cont broad spectrum IV abx while awaiting cultures and sensitivities.     Review of Systems   Constitutional:  Negative for chills and fever.   Respiratory:  Negative for cough and shortness of breath.    Cardiovascular:  Negative for chest pain and palpitations.   Genitourinary:  Positive for difficulty urinating and testicular pain.     Objective:     Vital Signs (Most Recent):  Temp: 98.9 °F (37.2 °C) (10/10/23 0900)  Pulse: 82 (10/10/23 0900)  Resp: 18 (10/10/23 0900)  BP: (!) 145/98 (10/10/23 0900)  SpO2: 100 % (10/10/23 0900) Vital Signs (24h Range):  Temp:  [97.8 °F (36.6 °C)-99 °F (37.2 °C)] 98.9 °F (37.2 °C)  Pulse:  [64-91] 82  Resp:  [18] 18  SpO2:  [95 %-100 %] 100 %  BP: (122-177)/(77-98) 145/98     Weight: 123.2 kg (271 lb 9.7 oz)  Body mass index is 34.87 kg/m².    Intake/Output Summary (Last 24 hours) at 10/10/2023 1010  Last data filed at 10/10/2023 0627  Gross per 24 hour   Intake 720 ml   Output 2000 ml   Net -1280 ml         Physical Exam  Constitutional:       General: He is not in acute distress.  Pulmonary:      Effort: No respiratory distress.      Breath sounds: No wheezing.   Abdominal:      General: There is no distension.      Tenderness: There is no abdominal tenderness.   Genitourinary:     Comments: Dennis in place. No testicular pain   Neurological:      General: No focal deficit present.      Mental Status: He is oriented to person, place, and time.             Significant Labs: All pertinent labs within the past 24 hours have been reviewed.    Significant Imaging: I have reviewed all pertinent imaging results/findings within the past 24 hours.

## 2023-10-10 NOTE — NURSING
Pt was told by Dr. BENTON That he has permission to leave the unit to walk around the hospital. Security was ok with him doing this as long as we let them know prior to him coming down.

## 2023-10-10 NOTE — ASSESSMENT & PLAN NOTE
- S/p cystoscopy with urethral dilation of bulbar stricture as well as percutaneous drainage of perineum abscess on 10/9/23.   - Pain and exam much improved since prior to surgery.  - Patient reporting bladder spasms. Recommend Oxybutynin 5mg TID PRN for bladder spasms  - On Vanc and Zosyn, tailor as indicated.  - F/u abscess cultures.   - Rest of care per primary.    - Maintain Dennis catheter for 1 week.   - Will arrange outpatient voiding trial and wound check.

## 2023-10-11 VITALS
TEMPERATURE: 98 F | BODY MASS INDEX: 34.86 KG/M2 | RESPIRATION RATE: 18 BRPM | OXYGEN SATURATION: 99 % | DIASTOLIC BLOOD PRESSURE: 76 MMHG | SYSTOLIC BLOOD PRESSURE: 158 MMHG | WEIGHT: 271.63 LBS | HEIGHT: 74 IN | HEART RATE: 77 BPM

## 2023-10-11 PROBLEM — L02.215 PERINEAL ABSCESS: Status: RESOLVED | Noted: 2023-10-08 | Resolved: 2023-10-11

## 2023-10-11 PROBLEM — N35.912 STRICTURE OF BULBOUS URETHRA IN MALE: Status: RESOLVED | Noted: 2023-10-09 | Resolved: 2023-10-11

## 2023-10-11 LAB — VANCOMYCIN TROUGH SERPL-MCNC: 16.9 UG/ML (ref 10–22)

## 2023-10-11 PROCEDURE — 25000003 PHARM REV CODE 250: Performed by: ANESTHESIOLOGY

## 2023-10-11 PROCEDURE — 25000003 PHARM REV CODE 250: Performed by: HOSPITALIST

## 2023-10-11 PROCEDURE — 99232 SBSQ HOSP IP/OBS MODERATE 35: CPT | Mod: ,,, | Performed by: UROLOGY

## 2023-10-11 PROCEDURE — 63600175 PHARM REV CODE 636 W HCPCS: Performed by: HOSPITALIST

## 2023-10-11 PROCEDURE — 99239 PR HOSPITAL DISCHARGE DAY,>30 MIN: ICD-10-PCS | Mod: ,,, | Performed by: STUDENT IN AN ORGANIZED HEALTH CARE EDUCATION/TRAINING PROGRAM

## 2023-10-11 PROCEDURE — 25000003 PHARM REV CODE 250: Performed by: STUDENT IN AN ORGANIZED HEALTH CARE EDUCATION/TRAINING PROGRAM

## 2023-10-11 PROCEDURE — 94761 N-INVAS EAR/PLS OXIMETRY MLT: CPT

## 2023-10-11 PROCEDURE — 99239 HOSP IP/OBS DSCHRG MGMT >30: CPT | Mod: ,,, | Performed by: STUDENT IN AN ORGANIZED HEALTH CARE EDUCATION/TRAINING PROGRAM

## 2023-10-11 PROCEDURE — 80202 ASSAY OF VANCOMYCIN: CPT | Performed by: STUDENT IN AN ORGANIZED HEALTH CARE EDUCATION/TRAINING PROGRAM

## 2023-10-11 PROCEDURE — 99232 PR SUBSEQUENT HOSPITAL CARE,LEVL II: ICD-10-PCS | Mod: ,,, | Performed by: UROLOGY

## 2023-10-11 PROCEDURE — 36415 COLL VENOUS BLD VENIPUNCTURE: CPT | Performed by: STUDENT IN AN ORGANIZED HEALTH CARE EDUCATION/TRAINING PROGRAM

## 2023-10-11 RX ORDER — CIPROFLOXACIN 750 MG/1
750 TABLET, FILM COATED ORAL EVERY 12 HOURS
Qty: 28 TABLET | Refills: 0 | Status: SHIPPED | OUTPATIENT
Start: 2023-10-11 | End: 2023-10-18 | Stop reason: SDUPTHER

## 2023-10-11 RX ADMIN — OXYCODONE HYDROCHLORIDE 5 MG: 5 TABLET ORAL at 09:10

## 2023-10-11 RX ADMIN — MUPIROCIN: 20 OINTMENT TOPICAL at 09:10

## 2023-10-11 RX ADMIN — VANCOMYCIN HYDROCHLORIDE 1750 MG: 500 INJECTION, POWDER, LYOPHILIZED, FOR SOLUTION INTRAVENOUS at 05:10

## 2023-10-11 RX ADMIN — OXYBUTYNIN CHLORIDE 5 MG: 5 TABLET ORAL at 06:10

## 2023-10-11 NOTE — PROGRESS NOTES
"[facility] Surg ([address])  Urology  Progress Note    Patient Name: Sinan Bear  MRN: [id number]  Admission Date: 10/7/2023  Hospital Length of Stay: 3 days  Code Status: Full Code   Attending Provider: Ewelina Gillis MD   Primary Care Physician: Raisa, Primary Doctor    Subjective:     HPI:  37yo M with several day history of perineal pain. He was seen at outside ER 2 days ago and diagnosed with prostatitis and given Cipro. Returns with worsening pain. No fevers. CT showed fluid collection deep in perineum adjacent to the bulbar urethra. Denies penile discharge. Denies dysuria, hematuria. Slow urinary stream occurred years ago, no change in stream recently. Denies fever. No h/o STIs. He is not diabetic. He does have h/o recurrent "boils" in inguinal/buttock area that have been I&Ded in the past. He was seen by  NP in 2019 with perineal pain - treated for prostatitis.     UA negative. WBC normal. Cr 1.0.          Interval History: NAEON. AFVSS. Pain much improved. No issues with catheter.      Objective:     Temp:  [97.9 °F (36.6 °C)-98.9 °F (37.2 °C)] 98.1 °F (36.7 °C)  Pulse:  [71-84] 73  Resp:  [18-20] 18  SpO2:  [96 %-100 %] 99 %  BP: (130-151)/(69-98) 151/79     Body mass index is 34.87 kg/m².           Drains       None                    Physical Exam  Vitals reviewed.   Constitutional:       General: He is not in acute distress.     Appearance: He is not diaphoretic.   HENT:      Head: Normocephalic and atraumatic.      Nose: Nose normal.   Eyes:      Conjunctiva/sclera: Conjunctivae normal.   Cardiovascular:      Rate and Rhythm: Normal rate.   Pulmonary:      Effort: Pulmonary effort is normal. No respiratory distress.   Abdominal:      General: There is no distension.   Genitourinary:     Comments: Dennis in place draining clear yellow urine. Perineum nontender to palpation, much improved. Area of induration much improved. No crepitus, fluctuance, or erythema.   Musculoskeletal:         " General: Normal range of motion.      Cervical back: Normal range of motion.   Skin:     General: Skin is dry.   Neurological:      Mental Status: He is alert.   Psychiatric:         Behavior: Behavior normal.         Thought Content: Thought content normal.           Significant Labs:    BMP:  Recent Labs   Lab 10/08/23  1147 10/09/23  1100 10/10/23  0503    138 136   K 4.0 4.6 4.0    105 104   CO2 23 24 25   BUN 7 8 10   CREATININE 1.0 1.1 1.0   CALCIUM 9.3 9.6 9.8         CBC:   Recent Labs   Lab 10/08/23  1147 10/09/23  1100 10/10/23  0503   WBC 9.37 7.78 11.84   HGB 15.8 15.5 15.3   HCT 48.1 47.0 46.7    289 317         All pertinent labs results from the past 24 hours have been reviewed.    Significant Imaging:  All pertinent imaging results/findings from the past 24 hours have been reviewed.          Assessment/Plan:     * Perineal abscess  - S/p cystoscopy with urethral dilation of bulbar stricture as well as percutaneous drainage of perineum abscess on 10/9/23.   - Pain and exam much improved since prior to surgery.  - Patient reporting bladder spasms. Recommend Oxybutynin 5mg TID PRN for bladder spasms  - On Vanc and Zosyn, tailor as indicated.  - abscess cultures no growth.   - Rest of care per primary.    - Ok with discharge from Urology perspective.   - Maintain Dennis catheter for 1 week.   - Will arrange outpatient voiding trial and wound check.         VTE Risk Mitigation (From admission, onward)         Ordered     IP VTE HIGH RISK PATIENT  Once         10/08/23 0349     Place sequential compression device  Until discontinued         10/08/23 0349     Reason for No Pharmacological VTE Prophylaxis  Once        Question:  Reasons:  Answer:  Risk of Bleeding    10/08/23 0349                Catina Miles MD  Urology  [facility]  Med Surg ([address])

## 2023-10-11 NOTE — NURSING
I was informed by security that pt went outside and came back inside this morning. Per  pts are not ever aloud to leave the unit, especially not to go outside. MD informed me yesterday that pt had permission to walk the hospital to get exercise. I was unaware that pt was going outside. Pt brought back up by security and I was educated on [facility] policy for pts walking the unit only and not anywhere else and that the policy trumps the doctor's allowance.

## 2023-10-11 NOTE — DISCHARGE INSTRUCTIONS
You will go home with the schuster and follow up with urology in 1 week - you have been educated about schuster care by our nursing staff  You will take oral antibiotics ciprofloxacin 750mg twice a day for 14 days   Please establish with a primary care physician for your health care needs/screening etc

## 2023-10-11 NOTE — NURSING
Pt. Being discharged home per MD orders. VSS, pt afebrile and no c/o pain at this time. Reviewed discharge instruction, follow-up instructions, and med with pt on urinary catheter care at home, the importance of infection prevention. Pt supplied with cleaning supplies and equipment s/a leg bag and wipe. And a graduated cylinder. Removed PIV access from R Ac. Dry gauze/ tape placed to site, CDI. Ride to be by family upon discharge and will transport per W/C.

## 2023-10-11 NOTE — SUBJECTIVE & OBJECTIVE
Interval History: NAEON. AFVSS. Pain much improved. No issues with catheter.      Objective:     Temp:  [97.9 °F (36.6 °C)-98.9 °F (37.2 °C)] 98.1 °F (36.7 °C)  Pulse:  [71-84] 73  Resp:  [18-20] 18  SpO2:  [96 %-100 %] 99 %  BP: (130-151)/(69-98) 151/79     Body mass index is 34.87 kg/m².           Drains       None                    Physical Exam  Vitals reviewed.   Constitutional:       General: He is not in acute distress.     Appearance: He is not diaphoretic.   HENT:      Head: Normocephalic and atraumatic.      Nose: Nose normal.   Eyes:      Conjunctiva/sclera: Conjunctivae normal.   Cardiovascular:      Rate and Rhythm: Normal rate.   Pulmonary:      Effort: Pulmonary effort is normal. No respiratory distress.   Abdominal:      General: There is no distension.   Genitourinary:     Comments: Dennis in place draining clear yellow urine. Perineum nontender to palpation, much improved. Area of induration much improved. No crepitus, fluctuance, or erythema.   Musculoskeletal:         General: Normal range of motion.      Cervical back: Normal range of motion.   Skin:     General: Skin is dry.   Neurological:      Mental Status: He is alert.   Psychiatric:         Behavior: Behavior normal.         Thought Content: Thought content normal.           Significant Labs:    BMP:  Recent Labs   Lab 10/08/23  1147 10/09/23  1100 10/10/23  0503    138 136   K 4.0 4.6 4.0    105 104   CO2 23 24 25   BUN 7 8 10   CREATININE 1.0 1.1 1.0   CALCIUM 9.3 9.6 9.8         CBC:   Recent Labs   Lab 10/08/23  1147 10/09/23  1100 10/10/23  0503   WBC 9.37 7.78 11.84   HGB 15.8 15.5 15.3   HCT 48.1 47.0 46.7    289 317         All pertinent labs results from the past 24 hours have been reviewed.    Significant Imaging:  All pertinent imaging results/findings from the past 24 hours have been reviewed.

## 2023-10-11 NOTE — DISCHARGE SUMMARY
"[facility], [place], operated by [facility] Surg [address] Medicine  Discharge Summary      Patient Name: Sinan Bear  MRN: [id number]  SRAVANI: [id number]  Patient Class: IP- Inpatient  Admission Date: 10/7/2023  Hospital Length of Stay: 3 days  Discharge Date and Time:  10/11/2023 8:27 AM  Attending Physician: Ewelina Gillis MD   Discharging Provider: Ewelina Saucedo MD  Primary Care Provider: Raisa, Primary Doctor    Primary Care Team: Networked reference to record PCT     HPI:   From H&P by Joon Cintron NP:  "The patient is a 38 year old male with no significant past medical history who presents with a painful area between his penis and rectum.  He states the pain started in the last 24 hours with acute worsening over the last 3-4 hours.  CT done showing abscess at the base of the penis.  He will be admitted for further management of his abscess and Urology consult."      Procedure(s) (LRB):  INCISION AND DRAINAGE ABSCESS (N/A)  CYSTOSCOPY, WITH URETHRAL DILATION (N/A)      Hospital Course:   Patient made NPO and admitted on empiric IV antibiotics (vancomycin and Zosyn) and Urology was consulted for evaluation.  He was watched for a day on IV antibiotics but as there was no clear improvement they qplanned to take him to the OR on 10/9.  Collection was immediately adjacent to the urethra so there was a concern for I&D of the perineum given risk of urethral cutaneous fistula developing due to the urethral proximity.    Patient underwent cystoscopy on 10/9 with Dr. Shelley.  Urethral stricture was seen requiring dilation.  The perineal abscess was aspirated and about 2 cc purulent drainage sent for culture.  Possibly this was a periurethral abscess associated with the stricture.  Schuster catheter was placed following the procedure.  Empiric antibiotics were continued while awaiting culture results. Cultures had no growth and after discussion with urology it was decided on 14 days course of ciprofloxacin, discharge with schuster to " allow area around stricture to heal and follow up with urology in 1 week. Pt was encouraged to establish with a PCP.        Goals of Care Treatment Preferences:  Code Status: Full Code      Consults:   Consults (From admission, onward)          Status Ordering Provider     Inpatient consult to Midline team  Once        Provider:  (Not yet assigned)    Acknowledged RANULFO SUN     Pharmacy to dose Vancomycin consult  Once        Provider:  (Not yet assigned)   See Hyperspace for full Linked Orders Report.    Acknowledged SILVINA MARCUS     Inpatient consult to Urology  Once        Provider:  Blanca Hickey MD    Completed SILVINA MARCUS            No new Assessment & Plan notes have been filed under this hospital service since the last note was generated.  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    Specialty: Urology  Why: Follow up in clinic this week  Contact information:  [address] [place] [address] [id number] [phone]               No, Primary Doctor .                           Patient Instructions:     You will go home with the schuster and follow up with urology in 1 week - you have been educated about schuster care by our nursing staff  You will take oral antibiotics ciprofloxacin 750mg twice a day for 14 days   Please establish with a primary care physician for your health care needs/screening etc        Ambulatory referral/consult to Internal Medicine   Standing Status: Future    Referral Priority: Routine Referral Type: Consultation   Referral Reason: Specialty Services Required   Requested Specialty: Internal Medicine   Number of Visits Requested: 1     Reason for not Prescribing Nicotine Replacement     Order Specific Question Answer Comments   Reason for not Prescribing: Patient refused      Reason for not Ordering Smoking Cessation Referral     Order Specific Question Answer Comments   Reason for not ordering: Patient refused          Medications:  Reconciled Home Medications:      Medication List        START taking these medications      ciprofloxacin HCl 750 MG tablet  Commonly known as: CIPRO  Take 1 tablet (750 mg total) by mouth every 12 (twelve) hours. for 14 days              Indwelling Lines/Drains at time of discharge:   Lines/Drains/Airways       Drain  Duration                  Urethral Catheter 10/09/23 0900  16 Fr. 1 day                    Time spent on the discharge of patient: 35 minutes         Ewelina Saucedo MD  Department of Hospital Medicine  [facility] - Med Surg ([address])

## 2023-10-11 NOTE — PROGRESS NOTES
Pharmacokinetic Assessment Follow Up: IV Vancomycin    Vancomycin serum concentration assessment(s):    The trough level was drawn correctly and can be used to guide therapy at this time. The measurement is within the desired definitive target range of 10 to 20 mcg/mL.    Vancomycin Regimen Plan:    Continue regimen to Vancomycin 1750 mg IV every 8 hours with next serum trough concentration measured at 03:30 prior to 4th dose on 10/12/2023    Drug levels (last 3 results):  Recent Labs   Lab Result Units 10/09/23  1620 10/10/23  1702 10/11/23  0441   Vancomycin-Trough ug/mL 9.4* <1.1* 16.9       Pharmacy will continue to follow and monitor vancomycin.    Please contact pharmacy at extension [phone] for questions regarding this assessment.    Thank you for the consult,   Farida Jean       Patient brief summary:  Sinan Bear is a 38 y.o. male initiated on antimicrobial therapy with IV Vancomycin for treatment of skin & soft tissue infection    Drug Allergies:   Review of patient's allergies indicates:  No Known Allergies    Actual Body Weight:   123.2 kg    Renal Function:   Estimated Creatinine Clearance: 136.9 mL/min (based on SCr of 1 mg/dL).,     Dialysis Method (if applicable):  N/A    CBC (last 72 hours):  Recent Labs   Lab Result Units 10/08/23  1147 10/09/23  1100 10/10/23  0503   WBC K/uL 9.37 7.78 11.84   Hemoglobin g/dL 15.8 15.5 15.3   Hematocrit % 48.1 47.0 46.7   Platelets K/uL 292 289 317   Gran % % 41.6 84.8* 75.1*   Lymph % % 44.5 12.2* 18.4   Mono % % 9.9 2.3* 5.6   Eosinophil % % 3.4 0.5 0.3   Basophil % % 0.4 0.1 0.2   Differential Method  Automated Automated Automated       Metabolic Panel (last 72 hours):  Recent Labs   Lab Result Units 10/08/23  1147 10/09/23  1100 10/10/23  0503   Sodium mmol/L 140 138 136   Potassium mmol/L 4.0 4.6 4.0   Chloride mmol/L 107 105 104   CO2 mmol/L 23 24 25   Glucose mg/dL 94 108 162*   BUN mg/dL 7 8 10   Creatinine mg/dL 1.0 1.1 1.0   Albumin g/dL 4.1 3.9 3.8    Total Bilirubin mg/dL 0.6 0.4 0.4   Alkaline Phosphatase U/L 71 68 66   AST U/L 17 16 15   ALT U/L 20 19 19   Magnesium mg/dL 2.1 2.2 2.2   Phosphorus mg/dL 3.1 2.1* 3.0       Vancomycin Administrations:  vancomycin given in the last 96 hours                     vancomycin 1.75 g in 5 % dextrose 500 mL IVPB (mg) 1,750 mg New Bag 10/11/23 0557      Restarted 10/10/23 2000     1,750 mg New Bag  1942     1,750 mg New Bag  1127     1,750 mg New Bag  0316     1,750 mg New Bag 10/09/23 1802    vancomycin 2 g in dextrose 5 % 500 mL IVPB (mg) 2,000 mg New Bag 10/09/23 0418      Restarted 10/08/23 1732     2,000 mg New Bag  1704    vancomycin 2 g in dextrose 5 % 500 mL IVPB (mg) 2,000 mg New Bag 10/08/23 0517                    Microbiologic Results:  Microbiology Results (last 7 days)       Procedure Component Value Units Date/Time    Culture, Anaerobic [[id number]] Collected: 10/09/23 0920    Order Status: Completed Specimen: Abscess from Perineum Updated: 10/10/23 0714     Anaerobic Culture Culture in progress    Narrative:      Perineal abscess    Aerobic culture [[id number]] Collected: 10/09/23 0920    Order Status: Completed Specimen: Abscess from Perineum Updated: 10/10/23 0656     Aerobic Bacterial Culture No growth    Narrative:      Perineal abscess    Gram stain [[id number]] Collected: 10/09/23 0920    Order Status: Completed Specimen: Abscess from Perineum Updated: 10/09/23 1453     Gram Stain Result Few WBC's      No organisms seen    Narrative:      Perineal abscess

## 2023-10-11 NOTE — ASSESSMENT & PLAN NOTE
- S/p cystoscopy with urethral dilation of bulbar stricture as well as percutaneous drainage of perineum abscess on 10/9/23.   - Pain and exam much improved since prior to surgery.  - Patient reporting bladder spasms. Recommend Oxybutynin 5mg TID PRN for bladder spasms  - On Vanc and Zosyn, tailor as indicated.  - abscess cultures no growth.   - Rest of care per primary.    - Ok with discharge from Urology perspective.   - Maintain Dennis catheter for 1 week.   - Will arrange outpatient voiding trial and wound check.

## 2023-10-11 NOTE — PLAN OF CARE
SW met with patient to discuss discharge. Patient appointments have been scheduled and added to AVS. Message sent to urology to schedule patient follow up. Patient family/friend to provide transportation home. All CM needs have been met.    10/11/23 1032   Final Note   Assessment Type Final Discharge Note   Anticipated Discharge Disposition Home   Hospital Resources/Appts/Education Provided Provided patient/caregiver with written discharge plan information;Appointments scheduled and added to AVS   Post-Acute Status   Discharge Delays None known at this time     [facility] - Med Surg ([address])  Discharge Final Note    Primary Care Provider: No, Primary Doctor    Expected Discharge Date: 10/11/2023    Final Discharge Note (most recent)       Final Note - 10/11/23 1032          Final Note    Assessment Type Final Discharge Note (P)      Anticipated Discharge Disposition Home or Self Care (P)      Hospital Resources/Appts/Education Provided Provided patient/caregiver with written discharge plan information;Appointments scheduled and added to AVS (P)         Post-Acute Status    Discharge Delays None known at this time (P)                      Important Message from Medicare             Contact Info       Blanca Hickey MD   Specialty: Urology    [address] [address]   Phone: [phone]       Next Steps: Follow up    Instructions: Follow up in clinic this week    No, Primary Doctor   Relationship: PCP - General        Next Steps: Follow up

## 2023-10-12 LAB — BACTERIA SPEC AEROBE CULT: NO GROWTH

## 2023-10-16 ENCOUNTER — OFFICE VISIT (OUTPATIENT)
Dept: PRIMARY CARE CLINIC | Facility: CLINIC | Age: 38
End: 2023-10-16
Payer: COMMERCIAL

## 2023-10-16 VITALS
HEART RATE: 94 BPM | OXYGEN SATURATION: 99 % | HEIGHT: 74 IN | WEIGHT: 275.38 LBS | SYSTOLIC BLOOD PRESSURE: 128 MMHG | DIASTOLIC BLOOD PRESSURE: 80 MMHG | BODY MASS INDEX: 35.34 KG/M2

## 2023-10-16 DIAGNOSIS — Z76.89 ENCOUNTER TO ESTABLISH CARE: ICD-10-CM

## 2023-10-16 DIAGNOSIS — N34.0 PERIURETHRAL ABSCESS: Primary | ICD-10-CM

## 2023-10-16 LAB — BACTERIA SPEC ANAEROBE CULT: NORMAL

## 2023-10-16 PROCEDURE — 3079F PR MOST RECENT DIASTOLIC BLOOD PRESSURE 80-89 MM HG: ICD-10-PCS | Mod: CPTII,S$GLB,, | Performed by: FAMILY MEDICINE

## 2023-10-16 PROCEDURE — 1159F MED LIST DOCD IN RCRD: CPT | Mod: CPTII,S$GLB,, | Performed by: FAMILY MEDICINE

## 2023-10-16 PROCEDURE — 1159F PR MEDICATION LIST DOCUMENTED IN MEDICAL RECORD: ICD-10-PCS | Mod: CPTII,S$GLB,, | Performed by: FAMILY MEDICINE

## 2023-10-16 PROCEDURE — 3008F PR BODY MASS INDEX (BMI) DOCUMENTED: ICD-10-PCS | Mod: CPTII,S$GLB,, | Performed by: FAMILY MEDICINE

## 2023-10-16 PROCEDURE — 3079F DIAST BP 80-89 MM HG: CPT | Mod: CPTII,S$GLB,, | Performed by: FAMILY MEDICINE

## 2023-10-16 PROCEDURE — 1111F PR DISCHARGE MEDS RECONCILED W/ CURRENT OUTPATIENT MED LIST: ICD-10-PCS | Mod: CPTII,S$GLB,, | Performed by: FAMILY MEDICINE

## 2023-10-16 PROCEDURE — 99203 OFFICE O/P NEW LOW 30 MIN: CPT | Mod: S$GLB,,, | Performed by: FAMILY MEDICINE

## 2023-10-16 PROCEDURE — 1111F DSCHRG MED/CURRENT MED MERGE: CPT | Mod: CPTII,S$GLB,, | Performed by: FAMILY MEDICINE

## 2023-10-16 PROCEDURE — 3074F PR MOST RECENT SYSTOLIC BLOOD PRESSURE < 130 MM HG: ICD-10-PCS | Mod: CPTII,S$GLB,, | Performed by: FAMILY MEDICINE

## 2023-10-16 PROCEDURE — 99203 PR OFFICE/OUTPT VISIT, NEW, LEVL III, 30-44 MIN: ICD-10-PCS | Mod: S$GLB,,, | Performed by: FAMILY MEDICINE

## 2023-10-16 PROCEDURE — 3008F BODY MASS INDEX DOCD: CPT | Mod: CPTII,S$GLB,, | Performed by: FAMILY MEDICINE

## 2023-10-16 PROCEDURE — 99999 PR PBB SHADOW E&M-EST. PATIENT-LVL III: CPT | Mod: PBBFAC,,, | Performed by: FAMILY MEDICINE

## 2023-10-16 PROCEDURE — 3074F SYST BP LT 130 MM HG: CPT | Mod: CPTII,S$GLB,, | Performed by: FAMILY MEDICINE

## 2023-10-16 PROCEDURE — 99999 PR PBB SHADOW E&M-EST. PATIENT-LVL III: ICD-10-PCS | Mod: PBBFAC,,, | Performed by: FAMILY MEDICINE

## 2023-10-16 RX ORDER — HYDROXYZINE PAMOATE 25 MG/1
25 CAPSULE ORAL 4 TIMES DAILY PRN
Qty: 30 CAPSULE | Refills: 2 | Status: SHIPPED | OUTPATIENT
Start: 2023-10-16

## 2023-10-16 RX ORDER — HYDROXYZINE PAMOATE 25 MG/1
25 CAPSULE ORAL 4 TIMES DAILY PRN
COMMUNITY
Start: 2023-06-19 | End: 2023-10-16 | Stop reason: SDUPTHER

## 2023-10-16 NOTE — PROGRESS NOTES
Clinic Note  10/16/2023      Subjective:       Patient ID:  Sinan is a 38 y.o. male being seen for an new visit.      Chief Complaint: Hospital Follow Up    Hospital follow-up and establish care-patient was recently hospitalized for perineal abscess versus possibly periurethral abscess associated with stricture.  Patient underwent cystoscopy on 10/09/2023 and had a urethral stricture requiring dilation, the abscess was drained with about 2 cc of purulent drainage with negative cultures.  Patient currently has Dennis catheter in place and has an appointment with Urology in 2 days for a voiding trial.  Patient was initially on vanc and Zosyn and discharged on a 14 day course of ciprofloxacin.  Patient reports overall feeling fine at this time however however will have blood in the urine whenever patient has a bowel movement.  Denies any blood in stool, denies any pain with bowel movements.      No family history on file.  Social History     Socioeconomic History    Marital status:    Tobacco Use    Smoking status: Every Day    Smokeless tobacco: Never   Substance and Sexual Activity    Alcohol use: No    Drug use: Yes     Types: Marijuana     Comment: occasionally\    Sexual activity: Not Currently     Social Determinants of Health     Financial Resource Strain: Low Risk  (10/8/2023)    Overall Financial Resource Strain (CARDIA)     Difficulty of Paying Living Expenses: Not hard at all   Food Insecurity: No Food Insecurity (10/8/2023)    Hunger Vital Sign     Worried About Running Out of Food in the Last Year: Never true     Ran Out of Food in the Last Year: Never true   Transportation Needs: No Transportation Needs (10/8/2023)    PRAPARE - Transportation     Lack of Transportation (Medical): No     Lack of Transportation (Non-Medical): No   Physical Activity: Insufficiently Active (10/8/2023)    Exercise Vital Sign     Days of Exercise per Week: 4 days     Minutes of Exercise per Session: 30 min   Stress:  No Stress Concern Present (10/8/2023)    [place] citizen [place] [employer] - Occupational Stress Questionnaire     Feeling of Stress : Not at all   Social Connections: Moderately Integrated (10/8/2023)    Social Connection and Isolation Panel [NHANES]     Frequency of Communication with Friends and Family: Three times a week     Frequency of Social Gatherings with Friends and Family: Three times a week     Attends [employer] Services: 1 to 4 times per year     Active Member of Clubs or Organizations: No     Attends Club or Organization Meetings: Never     Marital Status:    Housing Stability: Unknown (10/8/2023)    Housing Stability Vital Sign     Unable to Pay for Housing in the Last Year: No     Unstable Housing in the Last Year: No     Past Surgical History:   Procedure Laterality Date    CYSTOSCOPY WITH URETHRAL DILATION N/A 10/9/2023    Procedure: CYSTOSCOPY, WITH URETHRAL DILATION;  Surgeon: William Shelley MD;  Location: [facility];  Service: Urology;  Laterality: N/A;    INCISION AND DRAINAGE OF ABSCESS N/A 10/9/2023    Procedure: INCISION AND DRAINAGE ABSCESS;  Surgeon: William Shelley MD;  Location: [facility];  Service: Urology;  Laterality: N/A;  PERCUTANEOUS DRAINAGE  PERINEAL ABSCESS      Medication List with Changes/Refills   Current Medications    CIPROFLOXACIN HCL (CIPRO) 750 MG TABLET    Take 1 tablet (750 mg total) by mouth every 12 (twelve) hours. for 14 days   Changed and/or Refilled Medications    Modified Medication Previous Medication    HYDROXYZINE PAMOATE (VISTARIL) 25 MG CAP hydrOXYzine pamoate (VISTARIL) 25 MG Cap       Take 1 capsule (25 mg total) by mouth 4 (four) times daily as needed (anxiety / sleep).    Take 25 mg by mouth 4 (four) times daily as needed.     Patient Active Problem List   Diagnosis    Nicotine dependence, cigarettes, uncomplicated     Review of Systems   Constitutional:  Negative for chills, fever, malaise/fatigue and weight loss.   HENT:  Negative for  "congestion, sinus pain and sore throat.    Respiratory:  Negative for cough, shortness of breath and wheezing.    Cardiovascular:  Negative for chest pain and palpitations.   Gastrointestinal:  Negative for constipation, diarrhea, nausea and vomiting.   Genitourinary:  Negative for dysuria, frequency and urgency.   Musculoskeletal:  Negative for myalgias.   Skin:  Negative for rash.   Neurological:  Negative for headaches.         Objective:      /80   Pulse 94   Ht 6' 2" (1.88 m)   Wt 124.9 kg (275 lb 5.7 oz)   SpO2 99%   BMI 35.35 kg/m²   Estimated body mass index is 35.35 kg/m² as calculated from the following:    Height as of this encounter: 6' 2" (1.88 m).    Weight as of this encounter: 124.9 kg (275 lb 5.7 oz).  Physical Exam  Vitals reviewed.   Constitutional:       General: He is not in acute distress.     Appearance: He is not diaphoretic.   HENT:      Head: Normocephalic and atraumatic.   Eyes:      Conjunctiva/sclera: Conjunctivae normal.   Cardiovascular:      Rate and Rhythm: Normal rate and regular rhythm.      Heart sounds: Normal heart sounds.   Pulmonary:      Effort: Pulmonary effort is normal. No respiratory distress.      Breath sounds: Normal breath sounds. No wheezing.   Abdominal:      General: Bowel sounds are normal.      Palpations: Abdomen is soft.   Musculoskeletal:         General: Normal range of motion.      Cervical back: Normal range of motion.   Skin:     General: Skin is warm and dry.      Findings: No erythema or rash.   Neurological:      Mental Status: He is alert and oriented to person, place, and time.   Psychiatric:         Mood and Affect: Mood and affect normal.         Behavior: Behavior normal.         Thought Content: Thought content normal.         Judgment: Judgment normal.           Assessment and Plan:     1. Periurethral abscess vs perineal abscess  - currently has Dennis catheter in place along with urethral stricture, follow-up with Urology in 2 days  - " continue ciprofloxacin.  Patient was prescribed hydroxyzine in the hospital but was never given the medication, will refill so that patient can take as needed for anxiety and sleep    2. Encounter to establish care  - discussed with patient annual labs especially in the setting of recurrent abscesses, will obtain hemoglobin A1c and lipid panel  - Ambulatory referral/consult to Internal Medicine  - hydrOXYzine pamoate (VISTARIL) 25 MG Cap; Take 1 capsule (25 mg total) by mouth 4 (four) times daily as needed (anxiety / sleep).  Dispense: 30 capsule; Refill: 2  - Lipid Panel; Future  - Hemoglobin A1C; Future        Follow up:   No follow-ups on file.     Other Orders Placed This Visit:  Orders Placed This Encounter   Procedures    Lipid Panel     Standing Status:   Future     Standing Expiration Date:   10/16/2024    Hemoglobin A1C     Standing Status:   Future     Standing Expiration Date:   10/16/2024           Neftaly Calderón MD        This note is dictated on M*Modal word recognition program.  There are word recognition mistakes that are occasionally missed on review.

## 2023-10-18 ENCOUNTER — OFFICE VISIT (OUTPATIENT)
Dept: UROLOGY | Facility: CLINIC | Age: 38
End: 2023-10-18
Payer: COMMERCIAL

## 2023-10-18 VITALS
HEIGHT: 74 IN | HEART RATE: 85 BPM | BODY MASS INDEX: 35.34 KG/M2 | WEIGHT: 275.38 LBS | OXYGEN SATURATION: 98 % | DIASTOLIC BLOOD PRESSURE: 91 MMHG | SYSTOLIC BLOOD PRESSURE: 161 MMHG

## 2023-10-18 DIAGNOSIS — N35.912 BULBOUS URETHRAL STRICTURE: ICD-10-CM

## 2023-10-18 DIAGNOSIS — L02.215 PERINEAL ABSCESS: Primary | ICD-10-CM

## 2023-10-18 PROCEDURE — 1111F PR DISCHARGE MEDS RECONCILED W/ CURRENT OUTPATIENT MED LIST: ICD-10-PCS | Mod: CPTII,S$GLB,, | Performed by: NURSE PRACTITIONER

## 2023-10-18 PROCEDURE — 3008F PR BODY MASS INDEX (BMI) DOCUMENTED: ICD-10-PCS | Mod: CPTII,S$GLB,, | Performed by: NURSE PRACTITIONER

## 2023-10-18 PROCEDURE — 1159F PR MEDICATION LIST DOCUMENTED IN MEDICAL RECORD: ICD-10-PCS | Mod: CPTII,S$GLB,, | Performed by: NURSE PRACTITIONER

## 2023-10-18 PROCEDURE — 3080F PR MOST RECENT DIASTOLIC BLOOD PRESSURE >= 90 MM HG: ICD-10-PCS | Mod: CPTII,S$GLB,, | Performed by: NURSE PRACTITIONER

## 2023-10-18 PROCEDURE — 1160F RVW MEDS BY RX/DR IN RCRD: CPT | Mod: CPTII,S$GLB,, | Performed by: NURSE PRACTITIONER

## 2023-10-18 PROCEDURE — 99213 PR OFFICE/OUTPT VISIT, EST, LEVL III, 20-29 MIN: ICD-10-PCS | Mod: 25,S$GLB,, | Performed by: NURSE PRACTITIONER

## 2023-10-18 PROCEDURE — 3077F SYST BP >= 140 MM HG: CPT | Mod: CPTII,S$GLB,, | Performed by: NURSE PRACTITIONER

## 2023-10-18 PROCEDURE — 3077F PR MOST RECENT SYSTOLIC BLOOD PRESSURE >= 140 MM HG: ICD-10-PCS | Mod: CPTII,S$GLB,, | Performed by: NURSE PRACTITIONER

## 2023-10-18 PROCEDURE — 1160F PR REVIEW ALL MEDS BY PRESCRIBER/CLIN PHARMACIST DOCUMENTED: ICD-10-PCS | Mod: CPTII,S$GLB,, | Performed by: NURSE PRACTITIONER

## 2023-10-18 PROCEDURE — 3080F DIAST BP >= 90 MM HG: CPT | Mod: CPTII,S$GLB,, | Performed by: NURSE PRACTITIONER

## 2023-10-18 PROCEDURE — 51700 IRRIGATION OF BLADDER: CPT | Mod: 79,S$GLB,, | Performed by: NURSE PRACTITIONER

## 2023-10-18 PROCEDURE — 1111F DSCHRG MED/CURRENT MED MERGE: CPT | Mod: CPTII,S$GLB,, | Performed by: NURSE PRACTITIONER

## 2023-10-18 PROCEDURE — 99213 OFFICE O/P EST LOW 20 MIN: CPT | Mod: 25,S$GLB,, | Performed by: NURSE PRACTITIONER

## 2023-10-18 PROCEDURE — 3008F BODY MASS INDEX DOCD: CPT | Mod: CPTII,S$GLB,, | Performed by: NURSE PRACTITIONER

## 2023-10-18 PROCEDURE — 51700 PR IRRIGATION, BLADDER: ICD-10-PCS | Mod: 79,S$GLB,, | Performed by: NURSE PRACTITIONER

## 2023-10-18 PROCEDURE — 1159F MED LIST DOCD IN RCRD: CPT | Mod: CPTII,S$GLB,, | Performed by: NURSE PRACTITIONER

## 2023-10-18 RX ORDER — CIPROFLOXACIN 750 MG/1
750 TABLET, FILM COATED ORAL EVERY 12 HOURS
Qty: 14 TABLET | Refills: 0 | Status: SHIPPED | OUTPATIENT
Start: 2023-10-18 | End: 2023-10-25

## 2023-10-18 NOTE — PROGRESS NOTES
Subjective:      Sinan Bear is a 38 y.o. male who returns today regarding his schuster catheter.    The patient is s/p cystoscopy with urethral dilation of bulbar stricture as well as percutaneous drainage of perineum abscess on 10/9/23 with Dr. Shelley.    Presents today for VT. Remains on cipro. Denies fever/chills. Reports occasionally seeing small blood clots in the urine- mostly after BMs.     The following portions of the patient's history were reviewed and updated as appropriate: allergies, current medications, past family history, past medical history, past social history, past surgical history and problem list.    Review of Systems  Constitutional: no fever or chills  ENT: no nasal congestion or sore throat  Respiratory: no cough or shortness of breath  Cardiovascular: no chest pain or palpitations  Gastrointestinal: no nausea or vomiting, tolerating diet  Genitourinary: as per HPI  Hematologic/Lymphatic: no easy bruising or lymphadenopathy  Musculoskeletal: no arthralgias or myalgias  Neurological: no seizures or tremors  Behavioral/Psych: no auditory or visual hallucinations     Objective:   Vitals:  Vitals:    10/18/23 0811   BP: (!) 161/91   Pulse: 85       Physical Exam   General: alert and oriented, no acute distress  Head: normocephalic, atraumatic  Neck: supple, normal ROM  Respiratory: Symmetric expansion, non-labored breathing  Cardiovascular: regular rate and rhythm  Abdomen: soft, non tender, non distended  Genitourinary: schuster to gravity with clear, champ urine   Skin: normal coloration and turgor, no rashes, no suspicious skin lesions noted  Neuro: alert and oriented x3, no gross deficits  Psych: normal judgment and insight, normal mood/affect, and non-anxious    Lab Review   Urinalysis demonstrates : no sample, schuster  Lab Results   Component Value Date    WBC 11.84 10/10/2023    HGB 15.3 10/10/2023    HCT 46.7 10/10/2023    MCV 92 10/10/2023     10/10/2023     Lab Results   Component  "Value Date    CREATININE 1.0 10/10/2023    BUN 10 10/10/2023     No results found for: "PSA"  Imaging   None    Voiding Trial (Fill/Pull/Void): 150cc of sterile saline was instilled into the bladder through the previously placed schuster catheter.  The schuster balloon was then deflated and the schuster removed.  The patient subsequently voided 150cc, consistent with successful voiding trial.  The schuster was not replaced.    Assessment:     1. Perineal abscess    2. Bulbous urethral stricture      Plan:   Sinan MALONE was seen today for voiding trial.    Diagnoses and all orders for this visit:    Perineal abscess  -     ciprofloxacin HCl (CIPRO) 750 MG tablet; Take 1 tablet (750 mg total) by mouth every 12 (twelve) hours. for 7 days    Bulbous urethral stricture    Plan  --Successful VT  --Complete cipro  --Follow up in 2 weeks for PVR and symptom check   "

## 2023-10-23 ENCOUNTER — TELEPHONE (OUTPATIENT)
Dept: UROLOGY | Facility: CLINIC | Age: 38
End: 2023-10-23
Payer: COMMERCIAL

## 2023-11-07 ENCOUNTER — OFFICE VISIT (OUTPATIENT)
Dept: UROLOGY | Facility: CLINIC | Age: 38
End: 2023-11-07
Payer: COMMERCIAL

## 2023-11-07 VITALS
OXYGEN SATURATION: 95 % | DIASTOLIC BLOOD PRESSURE: 64 MMHG | HEIGHT: 74 IN | HEART RATE: 88 BPM | SYSTOLIC BLOOD PRESSURE: 114 MMHG | BODY MASS INDEX: 35.76 KG/M2 | WEIGHT: 278.63 LBS

## 2023-11-07 DIAGNOSIS — N35.912 BULBOUS URETHRAL STRICTURE: ICD-10-CM

## 2023-11-07 DIAGNOSIS — L02.215 PERINEAL ABSCESS: Primary | ICD-10-CM

## 2023-11-07 PROCEDURE — 99213 OFFICE O/P EST LOW 20 MIN: CPT | Mod: S$GLB,,, | Performed by: NURSE PRACTITIONER

## 2023-11-07 PROCEDURE — 3008F BODY MASS INDEX DOCD: CPT | Mod: CPTII,S$GLB,, | Performed by: NURSE PRACTITIONER

## 2023-11-07 PROCEDURE — 1159F PR MEDICATION LIST DOCUMENTED IN MEDICAL RECORD: ICD-10-PCS | Mod: CPTII,S$GLB,, | Performed by: NURSE PRACTITIONER

## 2023-11-07 PROCEDURE — 1111F DSCHRG MED/CURRENT MED MERGE: CPT | Mod: CPTII,S$GLB,, | Performed by: NURSE PRACTITIONER

## 2023-11-07 PROCEDURE — 99213 PR OFFICE/OUTPT VISIT, EST, LEVL III, 20-29 MIN: ICD-10-PCS | Mod: S$GLB,,, | Performed by: NURSE PRACTITIONER

## 2023-11-07 PROCEDURE — 3078F DIAST BP <80 MM HG: CPT | Mod: CPTII,S$GLB,, | Performed by: NURSE PRACTITIONER

## 2023-11-07 PROCEDURE — 1159F MED LIST DOCD IN RCRD: CPT | Mod: CPTII,S$GLB,, | Performed by: NURSE PRACTITIONER

## 2023-11-07 PROCEDURE — 3074F PR MOST RECENT SYSTOLIC BLOOD PRESSURE < 130 MM HG: ICD-10-PCS | Mod: CPTII,S$GLB,, | Performed by: NURSE PRACTITIONER

## 2023-11-07 PROCEDURE — 3074F SYST BP LT 130 MM HG: CPT | Mod: CPTII,S$GLB,, | Performed by: NURSE PRACTITIONER

## 2023-11-07 PROCEDURE — 3008F PR BODY MASS INDEX (BMI) DOCUMENTED: ICD-10-PCS | Mod: CPTII,S$GLB,, | Performed by: NURSE PRACTITIONER

## 2023-11-07 PROCEDURE — 1111F PR DISCHARGE MEDS RECONCILED W/ CURRENT OUTPATIENT MED LIST: ICD-10-PCS | Mod: CPTII,S$GLB,, | Performed by: NURSE PRACTITIONER

## 2023-11-07 PROCEDURE — 3078F PR MOST RECENT DIASTOLIC BLOOD PRESSURE < 80 MM HG: ICD-10-PCS | Mod: CPTII,S$GLB,, | Performed by: NURSE PRACTITIONER

## 2023-11-07 NOTE — PROGRESS NOTES
Subjective:      Sinan Bear is a 38 y.o. male who returns today for PVR.     The patient is s/p cystoscopy with urethral dilation of bulbar stricture as well as percutaneous drainage of perineum abscess on 10/9/23 with Dr. Shelley.    Successful VT on 10/18.     Returns today for PVR. Completed full course of cipro. Voiding well. Denies dysuria, gross hematuria and penile discharge. Denies frequency/urgency. Denies slow stream and ROM. Denies fever/chills.    The following portions of the patient's history were reviewed and updated as appropriate: allergies, current medications, past family history, past medical history, past social history, past surgical history and problem list.    Review of Systems  Constitutional: no fever or chills  ENT: no nasal congestion or sore throat  Respiratory: no cough or shortness of breath  Cardiovascular: no chest pain or palpitations  Gastrointestinal: no nausea or vomiting, tolerating diet  Genitourinary: as per HPI  Hematologic/Lymphatic: no easy bruising or lymphadenopathy  Musculoskeletal: no arthralgias or myalgias  Neurological: no seizures or tremors  Behavioral/Psych: no auditory or visual hallucinations     Objective:   Vitals:   Vitals:    11/07/23 0911   BP: 114/64   Pulse: 88     Physical Exam   General: alert and oriented, no acute distress  Head: normocephalic, atraumatic  Neck: supple, normal ROM  Respiratory: Symmetric expansion, non-labored breathing  Cardiovascular: regular rate and rhythm  Abdomen: soft, non tender, non distended  Genitourinary: deferred  Skin: normal coloration and turgor, no rashes, no suspicious skin lesions noted  Neuro: alert and oriented x3, no gross deficits  Psych: normal judgment and insight, normal mood/affect, and non-anxious    Lab Review   Urinalysis demonstrates : no sample  PVR: 0 mL  Lab Results   Component Value Date    WBC 11.84 10/10/2023    HGB 15.3 10/10/2023    HCT 46.7 10/10/2023    MCV 92 10/10/2023      "10/10/2023     Lab Results   Component Value Date    CREATININE 1.0 10/10/2023    BUN 10 10/10/2023     No results found for: "PSA"  Imaging   None    Assessment:     1. Perineal abscess    2. Bulbous urethral stricture      Plan:   Diagnoses and all orders for this visit:    Perineal abscess    Bulbous urethral stricture    Plan:  --Doing great  --Follow up in 6 months for PVR or sooner for any change in symptoms      "

## 2025-04-10 NOTE — PROGRESS NOTES
"[facility] ([address])  Urology  Progress Note    Patient Name: Sinan Bear  MRN: [id number]  Admission Date: 10/7/2023  Hospital Length of Stay: 2 days  Code Status: Full Code   Attending Provider: Ewelina Gillis MD   Primary Care Physician: Raisa, Primary Doctor    Subjective:     HPI:  37yo M with several day history of perineal pain. He was seen at outside ER 2 days ago and diagnosed with prostatitis and given Cipro. Returns with worsening pain. No fevers. CT showed fluid collection deep in perineum adjacent to the bulbar urethra. Denies penile discharge. Denies dysuria, hematuria. Slow urinary stream occurred years ago, no change in stream recently. Denies fever. No h/o STIs. He is not diabetic. He does have h/o recurrent "boils" in inguinal/buttock area that have been I&Ded in the past. He was seen by  NP in 2019 with perineal pain - treated for prostatitis.     UA negative. WBC normal. Cr 1.0.          Interval History: NAEON. AFVSS. Pain much improved. Patient complaining of bladder spasms overnight. Good UOP.      Objective:     Temp:  [97.7 °F (36.5 °C)-99 °F (37.2 °C)] 98.5 °F (36.9 °C)  Pulse:  [64-91] 84  Resp:  [16-20] 18  SpO2:  [95 %-100 %] 97 %  BP: (122-177)/(73-96) 151/86     Body mass index is 34.87 kg/m².           Drains       None                    Physical Exam  Vitals reviewed.   Constitutional:       General: He is not in acute distress.     Appearance: He is not diaphoretic.   HENT:      Head: Normocephalic and atraumatic.      Nose: Nose normal.   Eyes:      Conjunctiva/sclera: Conjunctivae normal.   Cardiovascular:      Rate and Rhythm: Normal rate.   Pulmonary:      Effort: Pulmonary effort is normal. No respiratory distress.   Abdominal:      General: There is no distension.   Genitourinary:     Comments: Dennis in place draining clear yellow urine. Perineum only mildly tender to palpation, much improved. Area of induration much improved. No crepitus, fluctuance, or " erythema.   Musculoskeletal:         General: Normal range of motion.      Cervical back: Normal range of motion.   Skin:     General: Skin is dry.   Neurological:      Mental Status: He is alert.   Psychiatric:         Behavior: Behavior normal.         Thought Content: Thought content normal.           Significant Labs:    BMP:  Recent Labs   Lab 10/08/23  1147 10/09/23  1100 10/10/23  0503    138 136   K 4.0 4.6 4.0    105 104   CO2 23 24 25   BUN 7 8 10   CREATININE 1.0 1.1 1.0   CALCIUM 9.3 9.6 9.8         CBC:   Recent Labs   Lab 10/08/23  1147 10/09/23  1100 10/10/23  0503   WBC 9.37 7.78 11.84   HGB 15.8 15.5 15.3   HCT 48.1 47.0 46.7    289 317         All pertinent labs results from the past 24 hours have been reviewed.    Significant Imaging:  All pertinent imaging results/findings from the past 24 hours have been reviewed.                    Assessment/Plan:     * Perineal abscess  - S/p cystoscopy with urethral dilation of bulbar stricture as well as percutaneous drainage of perineum abscess on 10/9/23.   - Pain and exam much improved since prior to surgery.  - Patient reporting bladder spasms. Recommend Oxybutynin 5mg TID PRN for bladder spasms  - On Vanc and Zosyn, tailor as indicated.  - F/u abscess cultures.   - Rest of care per primary.    - Maintain Dennis catheter for 1 week.   - Will arrange outpatient voiding trial and wound check.         VTE Risk Mitigation (From admission, onward)         Ordered     IP VTE HIGH RISK PATIENT  Once         10/08/23 0349     Place sequential compression device  Until discontinued         10/08/23 0349     Reason for No Pharmacological VTE Prophylaxis  Once        Question:  Reasons:  Answer:  Risk of Bleeding    10/08/23 0349                Catina Miles MD  Urology  [facility] ([address])   [address]

## (undated) DEVICE — SOL POVIDONE SCRUB IODINE 4 OZ

## (undated) DEVICE — NDL HYPO REG 25G X 1 1/2

## (undated) DEVICE — Device

## (undated) DEVICE — SPONGE COTTON TRAY 4X4IN

## (undated) DEVICE — SOL IRR SOD CHL .9% POUR

## (undated) DEVICE — IRRIGATION SET Y-TYPE TUR/BLAD

## (undated) DEVICE — CATH FOLEY 16F COUNCIL STA LOC

## (undated) DEVICE — GUIDE WIRE MOTION .035 X 150CM

## (undated) DEVICE — FOLLOWER HYM STRTTIP 20FR STRL

## (undated) DEVICE — GLOVE SENSICARE PI SURG 6.5

## (undated) DEVICE — SOL BETADINE 5%

## (undated) DEVICE — ELECTRODE REM PLYHSV RETURN 9

## (undated) DEVICE — APPLICATOR CHLORAPREP ORN 26ML

## (undated) DEVICE — SYR 10CC LUER LOCK

## (undated) DEVICE — NDL HYPO STD REG BVL 18GX1.5IN

## (undated) DEVICE — SUT VICRYL PLUS 3-0 SH 18IN

## (undated) DEVICE — GOWN SMART IMP BREATHABLE XXLG

## (undated) DEVICE — DRAPE LAP T SHT W/ INSTR PAD